# Patient Record
Sex: FEMALE | Race: WHITE | Employment: OTHER | URBAN - METROPOLITAN AREA
[De-identification: names, ages, dates, MRNs, and addresses within clinical notes are randomized per-mention and may not be internally consistent; named-entity substitution may affect disease eponyms.]

---

## 2021-04-30 LAB
ALBUMIN SERPL-MCNC: 1.4 G/DL
ALP BLD-CCNC: 63 U/L
ALT SERPL-CCNC: 0.87 U/L
ANION GAP SERPL CALCULATED.3IONS-SCNC: ABNORMAL MMOL/L
AST SERPL-CCNC: 17 U/L
BASOPHILS ABSOLUTE: 53 /ΜL
BASOPHILS RELATIVE PERCENT: 0.7 %
BILIRUB SERPL-MCNC: 0.4 MG/DL (ref 0.1–1.4)
BUN BLDV-MCNC: 27 MG/DL
CALCIUM SERPL-MCNC: 9 MG/DL
CHLORIDE BLD-SCNC: 105 MMOL/L
CHOLESTEROL, TOTAL: 193 MG/DL
CHOLESTEROL/HDL RATIO: 3.6
CO2: 28 MMOL/L
CREAT SERPL-MCNC: 0.74 MG/DL
EOSINOPHILS ABSOLUTE: 98 /ΜL
EOSINOPHILS RELATIVE PERCENT: 1.3 %
GFR CALCULATED: ABNORMAL
GLUCOSE BLD-MCNC: 88 MG/DL
HCT VFR BLD CALC: 45.4 % (ref 36–46)
HDLC SERPL-MCNC: 53 MG/DL (ref 35–70)
HEMOGLOBIN: 15.1 G/DL (ref 12–16)
LDL CHOLESTEROL CALCULATED: 113 MG/DL (ref 0–160)
LYMPHOCYTES ABSOLUTE: 2438 /ΜL
LYMPHOCYTES RELATIVE PERCENT: 32.5 %
MCH RBC QN AUTO: 30.5 PG
MCHC RBC AUTO-ENTMCNC: 33.3 G/DL
MCV RBC AUTO: 91.7 FL
MONOCYTES ABSOLUTE: 690 /ΜL
MONOCYTES RELATIVE PERCENT: 9.2 %
NEUTROPHILS ABSOLUTE: 4223 /ΜL
NEUTROPHILS RELATIVE PERCENT: 56.3 %
NONHDLC SERPL-MCNC: 140 MG/DL
PDW BLD-RTO: 13.1 %
PLATELET # BLD: 260 K/ΜL
PMV BLD AUTO: 11.4 FL
POTASSIUM SERPL-SCNC: 4.5 MMOL/L
RBC # BLD: 4.95 10^6/ΜL
SODIUM BLD-SCNC: 141 MMOL/L
TOTAL PROTEIN: 6.6
TRIGL SERPL-MCNC: 152 MG/DL
VLDLC SERPL CALC-MCNC: ABNORMAL MG/DL
WBC # BLD: 7.5 10^3/ML

## 2021-07-27 ENCOUNTER — OFFICE VISIT (OUTPATIENT)
Dept: FAMILY MEDICINE CLINIC | Age: 66
End: 2021-07-27
Payer: MEDICARE

## 2021-07-27 ENCOUNTER — HOSPITAL ENCOUNTER (OUTPATIENT)
Age: 66
Setting detail: SPECIMEN
Discharge: HOME | End: 2021-07-27
Payer: MEDICARE

## 2021-07-27 VITALS
DIASTOLIC BLOOD PRESSURE: 70 MMHG | HEIGHT: 66 IN | WEIGHT: 276.8 LBS | HEART RATE: 74 BPM | SYSTOLIC BLOOD PRESSURE: 120 MMHG | OXYGEN SATURATION: 98 % | TEMPERATURE: 97.6 F | BODY MASS INDEX: 44.48 KG/M2

## 2021-07-27 DIAGNOSIS — Z98.890 HISTORY OF ARTHROSCOPY OF RIGHT KNEE: ICD-10-CM

## 2021-07-27 DIAGNOSIS — M54.50 ACUTE LEFT-SIDED LOW BACK PAIN WITHOUT SCIATICA: ICD-10-CM

## 2021-07-27 DIAGNOSIS — M54.50 ACUTE LEFT-SIDED LOW BACK PAIN WITHOUT SCIATICA: Primary | ICD-10-CM

## 2021-07-27 PROCEDURE — 1123F ACP DISCUSS/DSCN MKR DOCD: CPT | Performed by: NURSE PRACTITIONER

## 2021-07-27 PROCEDURE — 99202 OFFICE O/P NEW SF 15 MIN: CPT | Performed by: NURSE PRACTITIONER

## 2021-07-27 PROCEDURE — G8417 CALC BMI ABV UP PARAM F/U: HCPCS | Performed by: NURSE PRACTITIONER

## 2021-07-27 PROCEDURE — G8427 DOCREV CUR MEDS BY ELIG CLIN: HCPCS | Performed by: NURSE PRACTITIONER

## 2021-07-27 PROCEDURE — 1036F TOBACCO NON-USER: CPT | Performed by: NURSE PRACTITIONER

## 2021-07-27 PROCEDURE — 3017F COLORECTAL CA SCREEN DOC REV: CPT | Performed by: NURSE PRACTITIONER

## 2021-07-27 PROCEDURE — 1090F PRES/ABSN URINE INCON ASSESS: CPT | Performed by: NURSE PRACTITIONER

## 2021-07-27 PROCEDURE — 4040F PNEUMOC VAC/ADMIN/RCVD: CPT | Performed by: NURSE PRACTITIONER

## 2021-07-27 PROCEDURE — 81001 URINALYSIS AUTO W/SCOPE: CPT

## 2021-07-27 PROCEDURE — G8400 PT W/DXA NO RESULTS DOC: HCPCS | Performed by: NURSE PRACTITIONER

## 2021-07-27 RX ORDER — IBUPROFEN 200 MG
200 TABLET ORAL EVERY 6 HOURS PRN
COMMUNITY
End: 2021-09-09

## 2021-07-27 RX ORDER — PREDNISONE 10 MG/1
TABLET ORAL
Qty: 30 TABLET | Refills: 0 | Status: SHIPPED | OUTPATIENT
Start: 2021-07-27 | End: 2021-08-09

## 2021-07-27 RX ORDER — ACETAMINOPHEN 500 MG
500 TABLET ORAL EVERY 6 HOURS PRN
COMMUNITY
End: 2021-09-09

## 2021-07-27 SDOH — ECONOMIC STABILITY: FOOD INSECURITY: WITHIN THE PAST 12 MONTHS, YOU WORRIED THAT YOUR FOOD WOULD RUN OUT BEFORE YOU GOT MONEY TO BUY MORE.: NEVER TRUE

## 2021-07-27 SDOH — ECONOMIC STABILITY: FOOD INSECURITY: WITHIN THE PAST 12 MONTHS, THE FOOD YOU BOUGHT JUST DIDN'T LAST AND YOU DIDN'T HAVE MONEY TO GET MORE.: NEVER TRUE

## 2021-07-27 ASSESSMENT — ENCOUNTER SYMPTOMS
BACK PAIN: 1
BOWEL INCONTINENCE: 0
ABDOMINAL PAIN: 0

## 2021-07-27 ASSESSMENT — PATIENT HEALTH QUESTIONNAIRE - PHQ9
2. FEELING DOWN, DEPRESSED OR HOPELESS: 0
SUM OF ALL RESPONSES TO PHQ9 QUESTIONS 1 & 2: 0
SUM OF ALL RESPONSES TO PHQ QUESTIONS 1-9: 0
1. LITTLE INTEREST OR PLEASURE IN DOING THINGS: 0
SUM OF ALL RESPONSES TO PHQ QUESTIONS 1-9: 0
SUM OF ALL RESPONSES TO PHQ QUESTIONS 1-9: 0

## 2021-07-27 ASSESSMENT — SOCIAL DETERMINANTS OF HEALTH (SDOH): HOW HARD IS IT FOR YOU TO PAY FOR THE VERY BASICS LIKE FOOD, HOUSING, MEDICAL CARE, AND HEATING?: NOT HARD AT ALL

## 2021-07-27 NOTE — PROGRESS NOTES
[address] Encounter  CHIEF COMPLAINT       Chief Complaint   Patient presents with    Back Pain     Pt states that back pain started 1 week ago. Pt states she was seen by a chiropractor for the pain, but it has not gotten better     HISTORY OF PRESENT ILLNESS   Krystina Tomas is a 72 y.o. female who presents with:    Back Pain  This is a new problem. The current episode started in the past 7 days. The problem occurs intermittently. The problem has been waxing and waning since onset. The pain is present in the lumbar spine. The quality of the pain is described as aching. The pain does not radiate. The pain is moderate. The symptoms are aggravated by bending, position and standing (pain after prolonged standing). Pertinent negatives include no abdominal pain, bladder incontinence, bowel incontinence, chest pain, dysuria, fever, numbness, paresis, paresthesias, pelvic pain, perianal numbness, tingling, weakness or weight loss. (Denies recent hx of trauma to the affected region, denies overlying redness. denies recent infection.) She has tried NSAIDs and chiropractic manipulation (+ Tylenol Arthritis) for the symptoms. The treatment provided no relief. Prior history of back problems: prior episode of L lower back pain that she was told was 2/2 a pulled muscle in her glute about 3 yrs ago. Previous work-up/ diagnostic testing for the current episode: none. Therapy to date includes: acetaminophen- Tylenol Arthritis, NSAID- OTC Aleve, oral steroid- dose-schuyler as above,chiropractic therapy/manipulation, home exercises. Patient moved to [place] from [place] one week ago. Has seen local chiropractor ROHIT Peres x 3 since symptoms began. Patient's medications, allergies, past medical, surgical, social and family histories were reviewed and updated as appropriate.      REVIEW OF SYSTEMS     Review of Systems   Constitutional: Negative for chills, fever, unexpected weight change and weight loss. Respiratory: Negative for cough, chest tightness and shortness of breath. Cardiovascular: Negative for chest pain, palpitations and leg swelling. Gastrointestinal: Negative for abdominal pain, blood in stool, bowel incontinence, diarrhea, nausea and vomiting. Genitourinary: Negative for bladder incontinence, difficulty urinating, dysuria, enuresis, flank pain, hematuria and pelvic pain. Musculoskeletal: Positive for back pain. Negative for joint swelling, myalgias and neck pain. Skin: Negative for color change and rash. Neurological: Negative for tingling, syncope, weakness, light-headedness, numbness and paresthesias. PAST MEDICAL HISTORY   No past medical history on file. SURGICAL HISTORY     Patient  has no past surgical history on file. CURRENT MEDICATIONS       Previous Medications    ACETAMINOPHEN (TYLENOL) 500 MG TABLET    Take 500 mg by mouth every 6 hours as needed for Pain    IBUPROFEN (ADVIL;MOTRIN) 200 MG TABLET    Take 200 mg by mouth every 6 hours as needed for Pain     ALLERGIES     Patient is is allergic to mushroom extract complex. FAMILY HISTORY     Patient'sfamily history is not on file. SOCIAL HISTORY     Patient  reports that she quit smoking about 6 months ago. She has a 25.00 pack-year smoking history. She has never used smokeless tobacco. She reports current alcohol use of about 3.0 standard drinks of alcohol per week. She reports that she does not use drugs. PHYSICAL EXAM       Vitals:    07/27/21 1106   BP: 120/70   Site: Right Upper Arm   Position: Sitting   Cuff Size: Medium Adult   Pulse: 74   Temp: 97.6 °F (36.4 °C)   TempSrc: Temporal   SpO2: 98%   Weight: 276 lb 12.8 oz (125.6 kg)   Height: 5' 6\" (1.676 m)     Physical Exam  Vitals reviewed. Constitutional:       General: She is not in acute distress. Appearance: She is well-groomed. She is not toxic-appearing. HENT:      Head: Normocephalic and atraumatic. Jaw:  There is normal jaw occlusion. Right Ear: External ear normal.      Left Ear: External ear normal.      Nose: Nose normal.      Mouth/Throat:      Lips: Pink. No lesions. Mouth: Mucous membranes are moist. No oral lesions. Pharynx: Oropharynx is clear. Uvula midline. Eyes:      General: Lids are normal.      Extraocular Movements: Extraocular movements intact. Conjunctiva/sclera: Conjunctivae normal.      Pupils: Pupils are equal, round, and reactive to light. Neck:      Trachea: Trachea and phonation normal.   Cardiovascular:      Rate and Rhythm: Normal rate and regular rhythm. Heart sounds: S1 normal and S2 normal. No murmur heard. No friction rub. No gallop. Pulmonary:      Effort: Pulmonary effort is normal. No tachypnea. Breath sounds: Normal breath sounds and air entry. Abdominal:      General: Bowel sounds are normal.      Palpations: Abdomen is soft. There is no hepatomegaly, splenomegaly or mass. Tenderness: There is no abdominal tenderness. There is no right CVA tenderness or left CVA tenderness. Musculoskeletal:      Cervical back: Normal, normal range of motion and neck supple. No spinous process tenderness or muscular tenderness. Thoracic back: Normal.      Lumbar back: Tenderness (paraspinal- no local tenderness or mass) present. No swelling, edema, deformity, signs of trauma, spasms or bony tenderness. Decreased range of motion (sl. decreased forward flexion 2/2 pain). Negative right straight leg raise test and negative left straight leg raise test.      Right lower leg: No edema. Left lower leg: No edema. Skin:     General: Skin is warm and dry. Neurological:      General: No focal deficit present. Mental Status: She is alert. Mental status is at baseline. Sensory: Sensation is intact. Motor: Motor function is intact. Gait: Gait is intact.    Psychiatric:         Mood and Affect: Mood and affect normal.         Speech: Speech normal.         Behavior: Behavior is cooperative. READY CARE COURSE   Labs:  No results found for this visit on 07/27/21. IMAGING:  No orders to display     Scheduled Meds:  Continuous Infusions:  PRN Meds:. PROCEDURES:  FINAL IMPRESSION    72 y.o. female with left lower back pain for one week. Non-traumatic, afebrile. No back pain red flags on exam. Likely musculoskeletal etiology given hx and exam. Patient is non-toxic appearing, NVI distally. Diagnosis Orders   1. Acute left-sided low back pain without sciatica  predniSONE (DELTASONE) 10 MG tablet    [facility] - [place]    Urinalysis   2. History of arthroscopy of right knee  [facility] - [facility]     DISPOSITION/PLAN   - Recommended she follow-up to establish care with a [facility]) PCP for continuity/ coordination of care  - For acute pain, relative rest, apply cold packs intermittently  - May switch to heat/ moist heat later- do not sleep on heating pad  - Advised to take OTC Tylenol Arthritis regularly vs prn for next 3-5d  - Prescription for short steroid course given  - Referral to Physical Therapy  - Educational material distributed  - Gradually reintroduce usual activities, gentle ROM, avoid heavy lifting until better  - Discussed warning symptoms which would prompt re-evaluation    PATIENT REFERRED TO:  Return for follow-up as needed if symptoms do not begin to improve in the next 1 week + to establish with a PCP. DISCHARGE MEDICATIONS:  New Prescriptions    PREDNISONE (DELTASONE) 10 MG TABLET    Take 4 tabs daily for 3 days, then 3 tabs for 3 days, then 2 tabs for 3 days, then 1 tab daily until finished. Oral Steroid Instructions: Take each dose with a small snack or meal to lessen potential GI upset. Follow dosing instructions provided with prescription. Common side effects include difficulty sleeping and irritability. Take full course as ordered.      Cannot display discharge medications since this is not an

## 2021-07-28 LAB
BACTERIA: ABNORMAL /HPF
BILIRUBIN URINE: NEGATIVE
BLOOD, URINE: NEGATIVE
CLARITY: ABNORMAL
COLOR: ABNORMAL
GLUCOSE URINE: NEGATIVE MG/DL
KETONES, URINE: ABNORMAL MG/DL
LEUKOCYTE ESTERASE, URINE: ABNORMAL
NITRITE, URINE: NEGATIVE
PH UA: 7 (ref 5–9)
PROTEIN UA: 100 MG/DL
SPECIFIC GRAVITY UA: 1.03 (ref 1–1.03)
UROBILINOGEN, URINE: 1 E.U./DL
WBC UA: ABNORMAL /HPF (ref 0–5)

## 2021-08-02 ENCOUNTER — HOSPITAL ENCOUNTER (OUTPATIENT)
Dept: PHYSICAL THERAPY | Age: 66
Setting detail: THERAPIES SERIES
Discharge: HOME | End: 2021-08-02
Payer: MEDICARE

## 2021-08-02 PROCEDURE — 97110 THERAPEUTIC EXERCISES: CPT

## 2021-08-02 PROCEDURE — 97163 PT EVAL HIGH COMPLEX 45 MIN: CPT

## 2021-08-02 ASSESSMENT — PAIN DESCRIPTION - FREQUENCY: FREQUENCY: INTERMITTENT

## 2021-08-02 ASSESSMENT — PAIN DESCRIPTION - DESCRIPTORS: DESCRIPTORS: SHARP;SORE

## 2021-08-02 ASSESSMENT — PAIN DESCRIPTION - ORIENTATION: ORIENTATION: LEFT

## 2021-08-02 ASSESSMENT — PAIN DESCRIPTION - PAIN TYPE: TYPE: ACUTE PAIN

## 2021-08-02 ASSESSMENT — PAIN DESCRIPTION - LOCATION: LOCATION: BACK;KNEE

## 2021-08-02 NOTE — PROGRESS NOTES
Screening  Patient Currently in Pain: Yes  Pain Assessment  Pain Assessment: 0-10  Pain Level:  (0 at rest, pain up to near 10/10 after standing or walking 20 min or longer)  Pain Type: Acute pain  Pain Location: Back;Knee  Pain Orientation: Left  Pain Radiating Towards: L low back, occasionally radiates to L neck; R knee pain intermittently 2-3/10 on ave  Pain Descriptors: Sarath Curia; Sore  Pain Frequency: Intermittent  Vital Signs  Patient Currently in Pain: Yes    Social/Functional History  Social/Functional History  Occupation: Retired  Leisure & Hobbies: swimming,    Objective   Observation/Palpation  Palpation:  Mod tenderness and increased tone with palpation of L lumbar paraspinals  Observation: R knee edema  Edema: R knee    AROM RLE (degrees)  RLE General AROM: 0-120 deg in supine (AAROM 125 deg in supine without pain); seated LAQ and return from LAQ to full flex in sitting painful  AROM LLE (degrees)  LLE AROM : WFL  LLE General AROM: L knee AROM 0-125 deg  without pain  Spine  Lumbar: flex WFL (currently no pain however per pt prior to oral steroid medication flex very painful and limited), ext 75% (\"feels good\"), R SB WFL, L SB 75% ERP    Strength RLE  Strength RLE: Exception  R Hip Flexion: 4/5  R Hip Extension: 4-/5  R Hip ABduction: 4-/5;4/5  R Knee Flexion: 4/5  R Knee Extension: 4-/5 (painful MMT knee ext)  R Ankle Dorsiflexion: 4+/5  Strength LLE  Strength LLE: WFL  Comment: hip strength grossly 4 to 4+/5, knee strength grossly 4 to 4 +/5, ankle df 4+/5     Additional Measures  Special Tests: Neg SLR, Neg scour and neg FABERs L LE, positive PA testing central and unilat L L4-5   Oswestry=31/50=62%     Ambulation  Ambulation?: Yes  Ambulation 1  Surface: carpet;level tile  Device: No Device  Assistance: Independent  Quality of Gait: slow pace, slight decreased stance time R Le compared to L LE, no complaints of back pain however complaints of 1/10 R knee pain and audible \"popping\" R knee  Gait Deviations: Slow Yolanda;Decreased step length;Decreased step height  Distance: 120 feet  Stairs/Curb  Stairs?: No     Exercises  Exercise 1: Reviewed exs issued by prior PT in [place] and by 's office for low back and starred exs to cont with-see below  Exercise 2: SLR x 10 with QS H3  Exercise 3: heel slide R knee x 10 H 3  Exercise 4: L sidelying R hip abd x 10 H3-vcs and tactile cues for form  Exercise 5: prone prop H 20 sec, also prone full ext x 5- no pain, however not issed as part of HEP yet  Exercise 6: pelvic tilt x 10 H3     Assessment   Conditions Requiring Skilled Therapeutic Intervention  Body structures, Functions, Activity limitations: Decreased functional mobility ; Decreased ROM; Decreased strength  Assessment: 72year old female presents to OP PT with diagnosis of LBP and recent history of R knee arthroscopy (May 2021) currently demonstrates slow guarded AROM lumbar spine with some pain, demonstrating weakness R LE and limited standing /walking tolerance. Pt would benefit from PT to address impairments and improve ROM, strength and overall functional mobilty. Treatment Diagnosis: LBP, weakness R LE S/P R knee arthroscopy  Prognosis: Good  REQUIRES PT FOLLOW UP: Yes  Treatment Initiated : IE completed assessing low back and R LE. Pt educated on therapy POC, reviewed and modified HEP. Pt educted on joint protection, instructed in safe correct technique for supine to sit transitions, encouraged frequent changes in positions and short walks as toleated.        Plan   Plan  Times per week: 2  Plan weeks: 4-5  Current Treatment Recommendations: Strengthening, ROM, Functional Mobility Training, Manual Therapy - Soft Tissue Mobilization, Home Exercise Program, Modalities (Manual therpy to lumbar spine/ R knee PRN, modalities PRN for pain reduction; KT/ taping to R knee PRN as pt had good response however monitor skin closely as pt reports rash following application of tape pt had purchased on her own) Goals  Short term goals  Time Frame for Short term goals: 2 wks  Short term goal 1: I with HEP and report compliance with HEP 5/7 days or greater at least 1x per day  Short term goal 2: Pt report continued decrease in low back pain (4/10 or less) during ADLs once done with oral steroid medication  Long term goals  Time Frame for Long term goals : 4-5 weeks  Long term goal 1: Improve AROM lumbar spine all planes WFL without pain and R knee AROM comparable to L knee without pain  Long term goal 2: Improve R LE strength 4+/5 or greater to allow Pt to perform ADLs with increased ease including reciprocal stairclimbing  Long term goal 3: Improve Oswestry less than 20%  Long term goal 4: Pt perform ADLs including community ambulation, housework, return to rec activities (swimming) and tolerating standing and walking 20-30 min or greater with 2/10 or less low back and R knee pain 75% of the day or greater  Long term goal 5: I with advanced HEP to further improve ROM and strength  Patient Goals   Patient goals : Get back to walking normally and doing every day activities wthout pain       Therapy Time   Individual Concurrent Group Co-treatment   Time In  1000         Time Out  1105         Minutes  [address], [place], [place]

## 2021-08-04 ENCOUNTER — HOSPITAL ENCOUNTER (OUTPATIENT)
Dept: PHYSICAL THERAPY | Age: 66
Setting detail: THERAPIES SERIES
Discharge: HOME | End: 2021-08-04
Payer: MEDICARE

## 2021-08-04 PROCEDURE — 97140 MANUAL THERAPY 1/> REGIONS: CPT

## 2021-08-04 PROCEDURE — 97116 GAIT TRAINING THERAPY: CPT

## 2021-08-04 PROCEDURE — 97110 THERAPEUTIC EXERCISES: CPT

## 2021-08-04 ASSESSMENT — PAIN DESCRIPTION - FREQUENCY: FREQUENCY: INTERMITTENT

## 2021-08-04 ASSESSMENT — PAIN DESCRIPTION - PAIN TYPE: TYPE: ACUTE PAIN;SURGICAL PAIN

## 2021-08-04 ASSESSMENT — PAIN DESCRIPTION - LOCATION: LOCATION: BACK;KNEE

## 2021-08-04 ASSESSMENT — PAIN SCALES - GENERAL: PAINLEVEL_OUTOF10: 0

## 2021-08-04 NOTE — PROGRESS NOTES
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 Arrives with 0/10 pain in back and knee  General Comment  Comments: MRI R knee-torn meniscus, bone spurs XRay R knee- severe OA  Pain Screening  Patient Currently in Pain: No  Pain Assessment  Pain Assessment: 0-10  Pain Level: 0  Pain Type: Acute pain;Surgical pain  Pain Location: Back;Knee  Pain Radiating Towards: L low back and R knee  Pain Frequency: Intermittent  Vital Signs  Patient Currently in Pain: No       Treatment Activities:      Ambulation  Ambulation?: Yes  Ambulation 1  Surface: carpet;level tile  Device: No Device  Assistance: Independent  Quality of Gait: imporved pace, good heels trkie, slightly longer step RLE than LLE, mild c/o knee apin ant lat approx 2 min into walk.    Distance: 440ft in 2 min and 16 sec  Stairs/Curb  Stairs?: No        PROM RLE (degrees)  RLE General PROM: knee 0-126  AROM RLE (degrees)  RLE General AROM: knee 0-123  AROM LLE (degrees)  LLE General AROM: knee 0-135       Exercises  Exercise 2: R SLR x 10 with QS H3  Exercise 3: heel slide R knee x 10 H 3  Exercise 4: L sidelying R hip abd x 10 H3-vcs and tactile cues for form  Exercise 5: prone prop to full arm 10 hold x 3 reps  Exercise 7: R SLR VMO 10 reps x 3 hold   Exercise 8: bridge with ball for adduction and abdominal bracing to start motion 10 reps x 3 hold   Exercise 9: prone hip ext with partiallly bent knee 310 reps 2 hold left and right knee  Exercise 10: attempt sit to stand to sit with R knee in slight more flexion as keeps RLE approx 7-8 inches in front of L foot for sit to stands, able to complete 5 reps, but inc paininR ant- lat knee     Modalities  Cryotherapy (Minutes\Location): cold pack x 10 minutes post to decrease R knee an-lat pain from2 to 0 at end of ice  Manual therapy  Other: KT tape to R knee to decrease pain and edema. 25-50% tension acorss Baker's cyst post R knee. @ \"I strips at 25-50% tension one lat to medial across patella, and inf lat to sup medial across patells to imporve patellar alignment      Assessment:   Conditions Requiring Skilled Therapeutic Intervention  Body structures, Functions, Activity limitations: Decreased functional mobility ; Decreased ROM; Decreased strength  Assessment: Pt with 0/10 pain in R knee and left low back , Added additional knee MARIANN to gain pbetter patella position. KT tape used  2 I strip for R knee patella tracking, and also for small post Bakers cyst.. Issued VMO and bridge with ball to HEP. Pain up to 2/10 with walk and sit to stands. cold pack x 10 minutes post to dec pain and edema, pain R knee post 0/10, Back remains at 0/10.      Pt relates 4 different RLE infections over lifetime and RLE always more swollen or bigger than LLE  Treatment Diagnosis: LBP, weakness R LE S/P R knee arthroscopy  REQUIRES PT FOLLOW UP: Yes  Activity Tolerance  Activity Tolerance: Patient limited by pain      Goals:  Short term goals  Time Frame for Short term goals: 2 wks  Short term goal 1: I with HEP and report compliance with HEP 5/7 days or greater at least 1x per day  Short term goal 2: Pt report continued decrease in low back pain (4/10 or less) during ADLs once done with oral steroid medication  Long term goals  Time Frame for Long term goals : 4-5 weeks  Long term goal 1: Improve AROM lumbar spine all planes WFL without pain and R knee AROM comparable to L knee without pain  Long term goal 2: Improve R LE strength 4+/5 or greater to allow Pt to perform ADLs with increased ease including reciprocal stairclimbing  Long term goal 3: Improve Oswestry less than 20%  Long term goal 4: Pt perform ADLs including community ambulation, housework, return to rec activities (swimming) and tolerating standing and walking 20-30 min or greater with 2/10 or less low back and R knee pain 75% of the day or greater  Long term goal 5: I with advanced HEP to further improve ROM and strength  Patient Goals   Patient goals : Get back to walking normally and doing every day activities wthout pain    Plan:  continue 2x week and advance as able per plan.             Therapy Time   Individual Concurrent Group Co-treatment   Time In  1200         Time Out  110         Minutes  60tx, 10 cold pack                 Rosita Thorpe, [id number]

## 2021-08-05 DIAGNOSIS — Z00.00 ROUTINE CHECK-UP: Primary | ICD-10-CM

## 2021-08-09 ENCOUNTER — HOSPITAL ENCOUNTER (OUTPATIENT)
Dept: PHYSICAL THERAPY | Age: 66
Setting detail: THERAPIES SERIES
Discharge: HOME | End: 2021-08-09
Payer: MEDICARE

## 2021-08-09 PROCEDURE — 97110 THERAPEUTIC EXERCISES: CPT

## 2021-08-09 ASSESSMENT — PAIN DESCRIPTION - FREQUENCY: FREQUENCY: INTERMITTENT

## 2021-08-09 ASSESSMENT — PAIN DESCRIPTION - PAIN TYPE: TYPE: ACUTE PAIN

## 2021-08-09 ASSESSMENT — PAIN DESCRIPTION - ORIENTATION: ORIENTATION: RIGHT

## 2021-08-09 ASSESSMENT — PAIN SCALES - GENERAL: PAINLEVEL_OUTOF10: 3

## 2021-08-09 ASSESSMENT — PAIN DESCRIPTION - DESCRIPTORS: DESCRIPTORS: SHARP

## 2021-08-09 ASSESSMENT — PAIN DESCRIPTION - LOCATION: LOCATION: KNEE

## 2021-08-09 NOTE — PROGRESS NOTES
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); R knee arthroscopy May 2021,  Family / Caregiver Present: No  Referring Practitioner: Amber Hernandez  PT Visit Information  Total # of Visits Approved: 10  Total # of Visits to Date: 3  Plan of Care/Certification Expiration Date: 21  No Show: 0  Canceled Appointment: 0  Subjective  Subjective: Pt reports no back pain currently but having some sharp pain of her right pain \"3/10\"   General Comment  Comments: MRI R knee-torn meniscus, bone spurs XRay R knee- severe OA  Pain Assessment  Pain Assessment: 0-10  Pain Level: 3  Pain Type: Acute pain  Pain Location: Knee  Pain Orientation: Right  Pain Descriptors: Sharp  Pain Frequency: Intermittent       Treatment Activities:         Exercises  Exercise 2: L LE SLR x 10 with QS H3; R LE x 7 reps   Exercise 3: heel slide R knee x 10 H 3  Exercise 4: L sidelying R hip abd x 10 H3-vcs and tactile cues for form  Exercise 6: pelvic tilt x 10 H3  Exercise 8: bridge with ball for adduction and abdominal bracing to start motion 10 reps x 3 hold   Exercise 9: prone hip ext with partiallly bent knee x10 reps 2 hold left and right knee  Exercise 11: *Seated HS stretch x 3 H30  Exercise 12: Hooklying abd with RTB x 15 reps   Exercise 13: Supine anterior hip stretch x 3 H30   Exercise 14: Supine lateral hip stretch x 3 H30      Modalities  Cryotherapy (Minutes\Location): To ice at home                             Assessment:   Conditions Requiring Skilled Therapeutic Intervention  Body structures, Functions, Activity limitations: Decreased functional mobility ; Decreased ROM; Decreased strength  Assessment: Pt comes to therpay with 0/10 back pain but 3/10 right knee pain.  Pt started PT with heel slides and PT added seated HS stretch to warm up pts muscles and decrease knee pain. Pt then performed supine ther ex needing cues for form and then added right hip stretches due to muscle soreness. Pt reports that the stretches 'felt good'. Pt then progressed with strengthen ther ex cues cues for proper technique. Pt then given a copy of seated ther ex and pt reports 0/10 back and only 2-3/10 right sharp knee pain with movement. Pt plans to ice at home. Continue to progress to pts tolerance. KT tape was intact. Pt to remove tape before next session.    Treatment Diagnosis: LBP, weakness R LE S/P R knee arthroscopy  REQUIRES PT FOLLOW UP: Yes  Activity Tolerance  Activity Tolerance: Patient limited by pain      G-Code:     OutComes Score                                                     Goals:  Short term goals  Time Frame for Short term goals: 2 wks  Short term goal 1: I with HEP and report compliance with HEP 5/7 days or greater at least 1x per day  Short term goal 2: Pt report continued decrease in low back pain (4/10 or less) during ADLs once done with oral steroid medication  Long term goals  Time Frame for Long term goals : 4-5 weeks  Long term goal 1: Improve AROM lumbar spine all planes WFL without pain and R knee AROM comparable to L knee without pain  Long term goal 2: Improve R LE strength 4+/5 or greater to allow Pt to perform ADLs with increased ease including reciprocal stairclimbing  Long term goal 3: Improve Oswestry less than 20%  Long term goal 4: Pt perform ADLs including community ambulation, housework, return to rec activities (swimming) and tolerating standing and walking 20-30 min or greater with 2/10 or less low back and R knee pain 75% of the day or greater  Long term goal 5: I with advanced HEP to further improve ROM and strength  Patient Goals   Patient goals : Get back to walking normally and doing every day activities wthout pain    Plan:       Frequency and duration of tx  Days: 2  Weeks: 4 (4-6) Therapy Time   Individual Concurrent Group Co-treatment   Time In  10;00am          Time Out  11:00am         Minutes  60 min                  Omar Martell PT  License and Documentation Cosign  Therapy License Number: [id number]

## 2021-08-11 ENCOUNTER — HOSPITAL ENCOUNTER (OUTPATIENT)
Dept: PHYSICAL THERAPY | Age: 66
Setting detail: THERAPIES SERIES
Discharge: HOME | End: 2021-08-11
Payer: MEDICARE

## 2021-08-11 PROCEDURE — 97116 GAIT TRAINING THERAPY: CPT

## 2021-08-11 PROCEDURE — 97110 THERAPEUTIC EXERCISES: CPT

## 2021-08-11 PROCEDURE — 97140 MANUAL THERAPY 1/> REGIONS: CPT

## 2021-08-11 NOTE — PROGRESS NOTES
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 states she removed her KT tape yesterday now her knee is alittle more sore only when she bends it. Pt. states KT helps. Pt. broguht in [facility] to go over as was having previous therapy in the past in [place]. Pt. denies pain currently in her back or knee. Pain Screening  Patient Currently in Pain: No  Vital Signs  Patient Currently in Pain: No       Treatment Activities:   Manual therapy  Other: KT tape to R knee to decrease pain and edema. 25-50% tension acorss Baker's cyst post R knee. @ \"I strips at 25-50% tension one lat to medial across patella, and inf lat to sup medial across patells to imporve patellar alignment              Ambulation  Ambulation?: Yes  Ambulation 1  Surface: carpet;level tile  Device: No Device  Assistance: Independent  Quality of Gait: imporved pace, good heels trkie, slightly longer step RLE than LLE no c/o knee pain with KTdonned and less clicking per pt.    Distance: 440'   Stairs/Curb  Stairs?: No                Exercises  Exercise 2: L LE SLR x 10 with QS H3; R LE x 10 reps VC for pelvic tilt   Exercise 4: L sidelying R hip abd x 10 H3-vcs and tactile cues for form  Exercise 5: prone prop to full arm 10 hold x 3 reps  Exercise 6: pelvic tilt x 10 H3  Exercise 7: R SLR VMO 10 reps x 3 hold   Exercise 8: bridge with ball for adduction and abdominal bracing to start motion 10 reps x 3 hold   Exercise 13: Supine anterior hip stretch x 3 H30   Exercise 14: Supine lateral hip stretch x 3 H30   Exercise 15: step up forward lead R x 10 4\"   Exercise 16: step up and over lead R 2\" x 10 inc 4 in inc pain and difficulty 1-2 reps with HR and cane for imporved technique. Modalities  Cryotherapy (Minutes\Location): To ice at home  Manual therapy  Other: KT tape to R knee to decrease pain and edema. 25-50% tension acorss Baker's cyst post R knee. @ \"I strips at 25-50% tension one lat to medial across patella, and inf lat to sup medial across patells to imporve patellar alignment                          Assessment:   Conditions Requiring Skilled Therapeutic Intervention  Body structures, Functions, Activity limitations: Decreased functional mobility ; Decreased ROM; Decreased strength  Assessment: Pt. arrives to therapy with no pain but pain can be up to 8/10 with returning to bending position after being straight. Reapplied KT tape as removed yesterday reports it helps. Reviewed exercises as pt. brought in HEP. VC needed for form with some exercizes. Attempted step ups able but stepping down with inc pain and poor technique thus deferred on 4\" box. Pt. denies pain post session. Pt. to ice at home. Treatment Diagnosis: LBP, weakness R LE S/P R knee arthroscopy  REQUIRES PT FOLLOW UP: Yes  Activity Tolerance  Activity Tolerance: Patient limited by pain; Patient Tolerated treatment well        Goals:  Short term goals  Time Frame for Short term goals: 2 wks  Short term goal 1: I with HEP and report compliance with HEP 5/7 days or greater at least 1x per day  Short term goal 2: Pt report continued decrease in low back pain (4/10 or less) during ADLs once done with oral steroid medication  Long term goals  Time Frame for Long term goals : 4-5 weeks  Long term goal 1: Improve AROM lumbar spine all planes WFL without pain and R knee AROM comparable to L knee without pain  Long term goal 2: Improve R LE strength 4+/5 or greater to allow Pt to perform ADLs with increased ease including reciprocal stairclimbing  Long term goal 3: Improve Oswestry less than 20%  Long term goal 4: Pt perform ADLs including community ambulation, housework, return to rec activities (swimming) and tolerating standing and walking 20-30 min or greater with 2/10 or less low back and R knee pain 75% of the day or greater  Long term goal 5: I with advanced HEP to further improve ROM and strength  Patient Goals   Patient goals : Get back to walking normally and doing every day activities wthout pain    Plan:       Frequency and duration of tx  Days: 2  Weeks: 4 (4-6)     Therapy Time   Individual Concurrent Group Co-treatment   Time In  100         Time Out  200         Minutes  [address] [place] [address]  License and [address] Number: [id number]

## 2021-08-16 ENCOUNTER — HOSPITAL ENCOUNTER (OUTPATIENT)
Dept: PHYSICAL THERAPY | Age: 66
Setting detail: THERAPIES SERIES
Discharge: HOME | End: 2021-08-16
Payer: MEDICARE

## 2021-08-16 PROCEDURE — 97110 THERAPEUTIC EXERCISES: CPT

## 2021-08-16 PROCEDURE — 97140 MANUAL THERAPY 1/> REGIONS: CPT

## 2021-08-16 ASSESSMENT — PAIN DESCRIPTION - LOCATION: LOCATION: KNEE

## 2021-08-16 ASSESSMENT — PAIN DESCRIPTION - ORIENTATION: ORIENTATION: RIGHT

## 2021-08-16 ASSESSMENT — PAIN SCALES - GENERAL: PAINLEVEL_OUTOF10: 1

## 2021-08-16 NOTE — PROGRESS NOTES
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 Pt reports less pain when wearing KT tape. Pt states her back feels great! \"I can even put my sock on my right foot now\". Pain Screening  Patient Currently in Pain: Yes  Pain Assessment  Pain Assessment: 0-10  Pain Level: 1  Pain Location: Knee  Pain Orientation: Right  Vital Signs  Patient Currently in Pain: Yes       Treatment Activities:   Manual therapy  Other: KT tape to R knee I strip to R knee at patella with medial pull 50% tension. I strip at patella with medial superior pull 50% tension for patellar tracking to dec pain. Exercises  Exercise 2: BLE SLR; H3'' x 10 (VC's for form)   Exercise 3: BLE VMO SLR; H3''   Exercise 4: L sidelying R hip abd x 10 H3-5 vcs and tactile cues for form  Exercise 8: bridge with ball for adduction and abdominal bracing to start motion 15 reps x 5 hold   Exercise 9: prone hip ext; H3'' x 10 (VC's and tactile cues for form)   Exercise 10: prone HS curls; H3'' x 10 (  Exercise 11: sidelying R hip ADDuction; H3'' x 10   Exercise 14: sidelying R IT band stretch; H30'' x 3   Exercise 20: recumbent bike; x 5 min      Modalities  Cryotherapy (Minutes\Location): pt issued ice to go. Manual therapy  Other: KT tape to R knee I strip to R knee at patella with medial pull 50% tension.  I strip at patella with medial superior pull 50% tension for patellar tracking to dec pain. Assessment:   Conditions Requiring Skilled Therapeutic Intervention  Body structures, Functions, Activity limitations: Decreased functional mobility ; Decreased ROM; Decreased strength  Assessment: Pt progressing with strengthening tolerating inc hold times but continues to require VC's for inc form. Pt reports no pain in back and less pain in knee with KT tape applied. Continue with POC. Treatment Diagnosis: LBP, weakness R LE S/P R knee arthroscopy  REQUIRES PT FOLLOW UP: Yes  Activity Tolerance  Activity Tolerance: Patient limited by pain; Patient Tolerated treatment well      G-Code:     OutComes Score                                                     Goals:  Short term goals  Time Frame for Short term goals: 2 wks  Short term goal 1: I with HEP and report compliance with HEP 5/7 days or greater at least 1x per day  Short term goal 2: Pt report continued decrease in low back pain (4/10 or less) during ADLs once done with oral steroid medication  Long term goals  Time Frame for Long term goals : 4-5 weeks  Long term goal 1: Improve AROM lumbar spine all planes WFL without pain and R knee AROM comparable to L knee without pain  Long term goal 2: Improve R LE strength 4+/5 or greater to allow Pt to perform ADLs with increased ease including reciprocal stairclimbing  Long term goal 3: Improve Oswestry less than 20%  Long term goal 4: Pt perform ADLs including community ambulation, housework, return to rec activities (swimming) and tolerating standing and walking 20-30 min or greater with 2/10 or less low back and R knee pain 75% of the day or greater  Long term goal 5: I with advanced HEP to further improve ROM and strength  Patient Goals   Patient goals : Get back to walking normally and doing every day activities wthout pain    Plan:       Frequency and duration of tx  Days: 2  Weeks:  (4-6)     Therapy Time   Individual Concurrent Group Co-treatment   Time In  1100         Time Out  1200         Minutes  Katherine [address] [place] [address]  License and [address] Number: [id number]

## 2021-08-18 ENCOUNTER — HOSPITAL ENCOUNTER (OUTPATIENT)
Dept: PHYSICAL THERAPY | Age: 66
Setting detail: THERAPIES SERIES
Discharge: HOME | End: 2021-08-18
Payer: MEDICARE

## 2021-08-18 PROCEDURE — 97116 GAIT TRAINING THERAPY: CPT

## 2021-08-18 PROCEDURE — 97140 MANUAL THERAPY 1/> REGIONS: CPT

## 2021-08-18 PROCEDURE — 97110 THERAPEUTIC EXERCISES: CPT

## 2021-08-18 ASSESSMENT — PAIN DESCRIPTION - LOCATION: LOCATION: KNEE

## 2021-08-18 ASSESSMENT — PAIN SCALES - GENERAL: PAINLEVEL_OUTOF10: 1

## 2021-08-18 ASSESSMENT — PAIN DESCRIPTION - ORIENTATION: ORIENTATION: RIGHT

## 2021-08-18 NOTE — PROGRESS NOTES
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 states her tape came off after wearing 1 day. Longer pants. Pain /10 today. Better with KT donned. Pain Screening  Patient Currently in Pain: Yes  Pain Assessment  Pain Assessment: 0-10  Pain Level: 1  Pain Location: Knee  Pain Orientation: Right  Vital Signs  Patient Currently in Pain: Yes       Treatment Activities:   Manual therapy  Other: KT tape to R knee I strip to R knee at patella with medial pull 50% tension. I strip at patella with medial superior pull 50% tension for patellar tracking to dec pain. Ambulation  Ambulation?: Yes  Ambulation 1  Surface: carpet;level tile  Device: No Device  Assistance: Independent  Quality of Gait: occ clicking R knee non painful. Pt. occ circumducting RLE and VC needed heel toe gait pattern   Distance: 440'  Stairs/Curb  Stairs?: Yes  Stairs  # Steps : 10  Stairs Height:  (7\" )  Rails: Left ascending  Device: No Device  Assistance: Stand by assistance  Comment: Up reciprical with inc effort ascending RLE, down nonreciprical laterally approx 1/4 turn VC for descending forward able to mild pain one step at a time.                   Exercises  Exercise 2: BLE SLR; H3'' 2x 10   Exercise 3: BLE VMO SLR; H3'' 2 x10 VC for form   Exercise 4: L sidelying R hip abd 2x 10 H3-5 vcs  for form  Exercise 8: bridge with ball for adduction and abdominal bracing to start motion 15 reps x 5 hold Exercise 9: prone hip ext; H3'' x 10 (VC's and tactile cues for form) BLE   Exercise 10: prone BLE HS curl; 2x 10 hold 5 sec slow release   Exercise 11: sidelying R hip ADDuction; H3'' x 10   Exercise 15: step up forward lead R x 10 6\"    Exercise 16: step up and over lead R 4\" x 10 VC for form and smoother descent   Exercise 17: step up and over lateral x 10 6\"   Exercise 20: recumbent bike; x 5 min         Manual therapy  Other: KT tape to R knee I strip to R knee at patella with medial pull 50% tension. I strip at patella with medial superior pull 50% tension for patellar tracking to dec pain. Assessment:   Conditions Requiring Skilled Therapeutic Intervention  Body structures, Functions, Activity limitations: Decreased functional mobility ; Decreased ROM; Decreased strength  Assessment: Pt. continues to progress with strengthening tolerating inc reps therex this date. Added step ups to advance function as still descending stairs one at a time with some discomfort and extra effort ascending R. Cont with KT tape less clicking post application. Although gait trial this date no KT some clicking R knee but reports non painful. No pain post session.     Treatment Diagnosis: LBP, weakness R LE S/P R knee arthroscopy  Prognosis: Good  REQUIRES PT FOLLOW UP: Yes  Activity Tolerance  Activity Tolerance: Patient Tolerated treatment well        Goals:  Short term goals  Time Frame for Short term goals: 2 wks  Short term goal 1: I with HEP and report compliance with HEP 5/7 days or greater at least 1x per day  Short term goal 2: Pt report continued decrease in low back pain (4/10 or less) during ADLs once done with oral steroid medication  Long term goals  Time Frame for Long term goals : 4-5 weeks  Long term goal 1: Improve AROM lumbar spine all planes WFL without pain and R knee AROM comparable to L knee without pain  Long term goal 2: Improve R LE strength 4+/5 or greater to allow Pt to perform ADLs with increased ease including reciprocal stairclimbing  Long term goal 3: Improve Oswestry less than 20%  Long term goal 4: Pt perform ADLs including community ambulation, housework, return to rec activities (swimming) and tolerating standing and walking 20-30 min or greater with 2/10 or less low back and R knee pain 75% of the day or greater  Long term goal 5: I with advanced HEP to further improve ROM and strength  Patient Goals   Patient goals : Get back to walking normally and doing every day activities wthout pain    Plan:       Frequency and duration of tx  Days: 2  Weeks:  (4-6)     Therapy Time   Individual Concurrent Group Co-treatment   Time In  1100         Time Out  1200         Minutes  60                 She Moss PTA  License and [address] Number: [id number]

## 2021-08-19 ASSESSMENT — ENCOUNTER SYMPTOMS
COLOR CHANGE: 0
COUGH: 0
NAUSEA: 0
DIARRHEA: 0
VOMITING: 0
CHEST TIGHTNESS: 0
BLOOD IN STOOL: 0
SHORTNESS OF BREATH: 0

## 2021-08-23 ENCOUNTER — HOSPITAL ENCOUNTER (OUTPATIENT)
Dept: PHYSICAL THERAPY | Age: 66
Setting detail: THERAPIES SERIES
Discharge: HOME | End: 2021-08-23
Payer: MEDICARE

## 2021-08-23 PROCEDURE — 97110 THERAPEUTIC EXERCISES: CPT

## 2021-08-23 PROCEDURE — 97116 GAIT TRAINING THERAPY: CPT

## 2021-08-25 ENCOUNTER — HOSPITAL ENCOUNTER (OUTPATIENT)
Dept: PHYSICAL THERAPY | Age: 66
Setting detail: THERAPIES SERIES
Discharge: HOME | End: 2021-08-25
Payer: MEDICARE

## 2021-08-25 PROCEDURE — 97116 GAIT TRAINING THERAPY: CPT

## 2021-08-25 PROCEDURE — 97110 THERAPEUTIC EXERCISES: CPT

## 2021-08-25 PROCEDURE — 97140 MANUAL THERAPY 1/> REGIONS: CPT

## 2021-08-25 ASSESSMENT — PAIN DESCRIPTION - LOCATION: LOCATION: KNEE

## 2021-08-25 ASSESSMENT — PAIN DESCRIPTION - PAIN TYPE: TYPE: ACUTE PAIN

## 2021-08-25 ASSESSMENT — PAIN DESCRIPTION - FREQUENCY: FREQUENCY: INTERMITTENT

## 2021-08-25 ASSESSMENT — PAIN DESCRIPTION - ORIENTATION: ORIENTATION: RIGHT

## 2021-08-25 ASSESSMENT — PAIN DESCRIPTION - DESCRIPTORS: DESCRIPTORS: THROBBING;ACHING

## 2021-08-25 ASSESSMENT — PAIN SCALES - GENERAL: PAINLEVEL_OUTOF10: 2

## 2021-08-25 NOTE — PROGRESS NOTES
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 states she had a horrible morning  Broke a picture frame and not used to the humidity. Pt. states she removed KT yesterday and was able to go for 2 mile walk wiht seated and standing RB due to humidity and ankle pain. Pt. states she was trying to walk without putting foot out to side. Pt. states she still gets pain wtih bending then straightening her knee and inconsistent states sometimes not painful. Pain Screening  Patient Currently in Pain: Yes  Pain Assessment  Pain Assessment: 0-10  Pain Level: 2  Pain Type: Acute pain  Pain Location: Knee  Pain Orientation: Right  Pain Descriptors: Throbbing;Aching  Pain Frequency: Intermittent  Vital Signs  Patient Currently in Pain: Yes       Treatment Activities:   Manual therapy  Other: KT tape to R knee I strip to R knee at patella with medial pull 50% tension. I strip at patella with medial superior pull 50% tension for patellar tracking to dec pain. Also 25% tnesion at baker's cyst to dec edema               Ambulation  Ambulation?: Yes  Ambulation 1  Surface: carpet;level tile  Device: No Device  Assistance: Independent  Quality of Gait: occ clicking R knee not painful this date.   heel toe gait pattern   Distance: 440'  Comments: KT tape not donned   Stairs/Curb  Stairs?: No                Exercises  Exercise 2: BLE SLR; H5'' 2x 10   Exercise 3: BLE VMO SLR; H5'' 2 x10   Exercise 4: minisquaats ball between legs for cueing x 10 hold 3 sec  Exercise 5: standing gastroc stretch 30 sec x 2   Exercise 6: 90/90 HS stretch 30 sec x 2 B   Exercise 8: bridge with ball for adduction and abdominal bracing to start motion 2 x10  reps x 5 hold   Exercise 14: sidelying R IT band stretch; H30'' x 3   Exercise 20: recumbent bike; x 5 min      Modalities  Cryotherapy (Minutes\Location): pt issued ice to go. Manual therapy  Other: KT tape to R knee I strip to R knee at patella with medial pull 50% tension. I strip at patella with medial superior pull 50% tension for patellar tracking to dec pain. Also 25% tnesion at baker's cyst to dec edema                           Assessment:   Conditions Requiring Skilled Therapeutic Intervention  Body structures, Functions, Activity limitations: Decreased functional mobility ; Decreased ROM; Decreased strength  Assessment: Good focus on ambulation this date with better alignment of ankle  also heel toe pattern. Educated not to try to overcorrect or aim to do for short periods of time to avoid agravating ankle. Pt. with cont c/o intermittent R knee pain with bending and straightening her knee. Pt. deferred ankle # this date \"I don't think I can do it  I'm kind of tired after yesterday. \" Cont with KT tape ths date as provides good relief.   Pt. denies pain post.   Treatment Diagnosis: LBP, weakness R LE S/P R knee arthroscopy  Prognosis: Good  REQUIRES PT FOLLOW UP: Yes  Activity Tolerance  Activity Tolerance: Patient Tolerated treatment well        Goals:  Short term goals  Time Frame for Short term goals: 2 wks  Short term goal 1: I with HEP and report compliance with HEP 5/7 days or greater at least 1x per day  Short term goal 2: Pt report continued decrease in low back pain (4/10 or less) during ADLs once done with oral steroid medication  Long term goals  Time Frame for Long term goals : 4-5 weeks  Long term goal 1: Improve AROM lumbar spine all planes WFL without pain and R knee AROM comparable to L knee without pain  Long term goal 2: Improve R LE strength 4+/5 or greater to allow Pt to perform ADLs with increased ease including reciprocal stairclimbing  Long term goal 3: Improve Oswestry less than 20%  Long term goal 4: Pt perform ADLs including community ambulation, housework, return to rec activities (swimming) and tolerating standing and walking 20-30 min or greater with 2/10 or less low back and R knee pain 75% of the day or greater  Long term goal 5: I with advanced HEP to further improve ROM and strength  Patient Goals   Patient goals : Get back to walking normally and doing every day activities wthout pain    Plan:       Frequency and duration of tx  Days: 2  Weeks:  (4-6)     Therapy Time   Individual Concurrent Group Co-treatment   Time In  1200         Time Out  100         Minutes  [address] [place] [address], [facility]  License and [address] Number: [id number]

## 2021-08-30 ENCOUNTER — HOSPITAL ENCOUNTER (OUTPATIENT)
Dept: PHYSICAL THERAPY | Age: 66
Setting detail: THERAPIES SERIES
Discharge: HOME | End: 2021-08-30
Payer: MEDICARE

## 2021-08-30 PROCEDURE — 97530 THERAPEUTIC ACTIVITIES: CPT

## 2021-08-30 NOTE — PROGRESS NOTES
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 General Comment  Comments: DC today due to pt being indep with HEP           Treatment Activities:   Manual therapy  Other: KT tape was intact so reviewed with pt how to apply tape with the same technique and cut strips for pt with instructions on the strips of KT tape in regards to direction and tension %                          AROM RLE (degrees)  RLE General AROM: Right in sitting 0-126 AROM   Strength RLE  R Hip Flexion: 4/5;4+/5  R Hip Extension: 4/5  R Hip ABduction: 4/5;4+/5  R Knee Flexion: 4/5  R Knee Extension: 4/5;4+/5  R Ankle Dorsiflexion: 4+/5  R Ankle Plantar flexion: 4+/5             Manual therapy  Other: KT tape was intact so reviewed with pt how to apply tape with the same technique and cut strips for pt with instructions on the strips of KT tape in regards to direction and tension %                          Assessment:   Conditions Requiring Skilled Therapeutic Intervention  Body structures, Functions, Activity limitations: Decreased functional mobility ; Decreased ROM; Decreased strength  Assessment: Completed DC today due to pt reports that her back pain is much better and she reports also having a lot less right knee pain with most activity. Pt is now walking 2 miles (1 mile and then rest before completing the 2nd mile).  Pt reports getting much relief with the KT tape and that her tape on her right knee was still intact so PT reviewed how pt can apply to her right knee. PT demonstrated on pt and on PT how to apply and then cut strips and wrote instuctions on the tape as far as where to apply, which directions to apply and with how much tension. PT also discussed HEP and how to advance using pain as her guide. Pt agreeable with DC today and plans to continue on her own.    Treatment Diagnosis: LBP, weakness R LE S/P R knee arthroscopy  Prognosis: Good  REQUIRES PT FOLLOW UP: No               Goals:  Short term goals  Time Frame for Short term goals: 2 wks  Short term goal 1: I with HEP and report compliance with HEP 5/7 days or greater at least 1x per day (Goal met )  Short term goal 2: Pt report continued decrease in low back pain (4/10 or less) during ADLs once done with oral steroid medication (Gaol met - 1-2/10 pain )  Long term goals  Time Frame for Long term goals : 4-5 weeks  Long term goal 1: Improve AROM lumbar spine all planes WFL without pain and R knee AROM comparable to L knee without pain (Goal met - now able to pick item off floor )  Long term goal 2: Improve R LE strength 4+/5 or greater to allow Pt to perform ADLs with increased ease including reciprocal stairclimbing (Progressing towards goal )  Long term goal 3: Improve Oswestry less than 20% (Oswestry at DC=0/50=0% disability - goal met )  Long term goal 4: Pt perform ADLs including community ambulation, housework, return to rec activities (swimming) and tolerating standing and walking 20-30 min or greater with 2/10 or less low back and R knee pain 75% of the day or greater (ABle to walk 1 mile before sitting with no back pain )  Long term goal 5: I with advanced HEP to further improve ROM and strength (Goal met )  Patient Goals   Patient goals : Get back to walking normally and doing every day activities wthout pain    Plan:  DC to HEP            Therapy Time   Individual Concurrent Group Co-treatment   Time In

## 2021-09-09 ENCOUNTER — OFFICE VISIT (OUTPATIENT)
Dept: FAMILY MEDICINE CLINIC | Age: 66
End: 2021-09-09
Payer: MEDICARE

## 2021-09-09 VITALS
RESPIRATION RATE: 18 BRPM | DIASTOLIC BLOOD PRESSURE: 90 MMHG | BODY MASS INDEX: 45.06 KG/M2 | HEART RATE: 74 BPM | OXYGEN SATURATION: 95 % | HEIGHT: 66 IN | WEIGHT: 280.4 LBS | TEMPERATURE: 96.6 F | SYSTOLIC BLOOD PRESSURE: 134 MMHG

## 2021-09-09 DIAGNOSIS — E78.5 HYPERLIPIDEMIA, UNSPECIFIED HYPERLIPIDEMIA TYPE: ICD-10-CM

## 2021-09-09 DIAGNOSIS — M54.2 NECK PAIN: Primary | ICD-10-CM

## 2021-09-09 DIAGNOSIS — E66.01 MORBID OBESITY (HCC): ICD-10-CM

## 2021-09-09 DIAGNOSIS — Z11.59 NEED FOR HEPATITIS C SCREENING TEST: ICD-10-CM

## 2021-09-09 DIAGNOSIS — R03.0 ELEVATED BLOOD-PRESSURE READING WITHOUT DIAGNOSIS OF HYPERTENSION: ICD-10-CM

## 2021-09-09 DIAGNOSIS — D17.0 LIPOMA OF SCALP: ICD-10-CM

## 2021-09-09 PROBLEM — Z87.891 HISTORY OF TOBACCO USE: Chronic | Status: ACTIVE | Noted: 2021-09-09

## 2021-09-09 PROBLEM — M17.0 PRIMARY OSTEOARTHRITIS OF BOTH KNEES: Chronic | Status: ACTIVE | Noted: 2021-09-09

## 2021-09-09 PROBLEM — I83.93 VARICOSE VEINS OF BOTH LOWER EXTREMITIES: Chronic | Status: ACTIVE | Noted: 2021-09-09

## 2021-09-09 PROCEDURE — 99204 OFFICE O/P NEW MOD 45 MIN: CPT | Performed by: FAMILY MEDICINE

## 2021-09-09 RX ORDER — FLUOCINONIDE TOPICAL SOLUTION USP, 0.05% 0.5 MG/ML
SOLUTION TOPICAL 2 TIMES DAILY
COMMUNITY

## 2021-09-09 ASSESSMENT — ENCOUNTER SYMPTOMS
ABDOMINAL PAIN: 0
NAUSEA: 0
CHEST TIGHTNESS: 0
VOMITING: 0
SHORTNESS OF BREATH: 0

## 2021-09-09 NOTE — ASSESSMENT & PLAN NOTE
Will follow over time. Patient declined referral to dermatology for removal and definitive diagnosis.

## 2021-09-09 NOTE — ASSESSMENT & PLAN NOTE
Patient counseled on healthy dietary choices and the benefits of a lower salt and/or lower carbohydrate diet as appropriate. Patient also counseled on benefits of moderate intensity cardiovascular exercise for 20-30 minutes at least 3-5 days a week. Advice was given to make small changes over time, setting smaller achievable goals until recommended lifestyle changes are reached. Patient referred to dietitian to help with healthy eating plan to promote healthy weight loss. I reviewed with her the potential for starting medication to help with weight loss should initial conservative measures not provide sufficient progress.

## 2021-09-09 NOTE — PATIENT INSTRUCTIONS
Patient Education        Neck Strain or Sprain: Rehab Exercises  Introduction  Here are some examples of exercises for you to try. The exercises may be suggested for a condition or for rehabilitation. Start each exercise slowly. Ease off the exercises if you start to have pain. You will be told when to start these exercises and which ones will work best for you. How to do the exercises  Neck rotation   1. Sit in a firm chair, or stand up straight. 2. Keeping your chin level, turn your head to the right, and hold for 15 to 30 seconds. 3. Turn your head to the left and hold for 15 to 30 seconds. 4. Repeat 2 to 4 times to each side. Neck stretches   1. Look straight ahead, and tip your right ear to your right shoulder. Do not let your left shoulder rise up as you tip your head to the right. 2. Hold for 15 to 30 seconds. 3. Tilt your head to the left. Do not let your right shoulder rise up as you tip your head to the left. 4. Hold for 15 to 30 seconds. 5. Repeat 2 to 4 times to each side. Forward neck flexion   1. Sit in a firm chair, or stand up straight. 2. Bend your head forward. 3. Hold for 15 to 30 seconds. 4. Repeat 2 to 4 times. Lateral (side) bend strengthening   1. With your right hand, place your first two fingers on your right temple. 2. Start to bend your head to the side while using gentle pressure from your fingers to keep your head from bending. 3. Hold for about 6 seconds. 4. Repeat 8 to 12 times. 5. Switch hands and repeat the same exercise on your left side. Forward bend strengthening   1. Place your first two fingers of either hand on your forehead. 2. Start to bend your head forward while using gentle pressure from your fingers to keep your head from bending. 3. Hold for about 6 seconds. 4. Repeat 8 to 12 times. Neutral position strengthening   1. Using one hand, place your fingertips on the back of your head at the top of your neck.   2. Start to bend your head backward while using gentle pressure from your fingers to keep your head from bending. 3. Hold for about 6 seconds. 4. Repeat 8 to 12 times. Chin tuck   1. Lie on the floor with a rolled-up towel under your neck. Your head should be touching the floor. 2. Slowly bring your chin toward your chest.  3. Hold for a count of 6, and then relax for up to 10 seconds. 4. Repeat 8 to 12 times. Follow-up care is a key part of your treatment and safety. Be sure to make and go to all appointments, and call your doctor if you are having problems. It's also a good idea to know your test results and keep a list of the medicines you take. Where can you learn more? Go to https://Gloss48.News360. org and sign in to your [employer] account. Enter M679 in the Climateminder box to learn more about \"Neck Strain or Sprain: Rehab Exercises. \"     If you do not have an account, please click on the \"Sign Up Now\" link. Current as of: November 16, 2020               Content Version: 12.9  © 2239-1996 [employer]wise, [employer]. Care instructions adapted under license by [facility] [place] ([facility]). If you have questions about a medical condition or this instruction, always ask your healthcare professional. [address] any warranty or liability for your use of this information.

## 2021-09-09 NOTE — PROGRESS NOTES
Aubrie Womack (: 1955) is a 72 y.o. female, Established patient, who presents today for:    Chief Complaint   Patient presents with    New Patient     Patient presents today to establish care. Previous PCP was Dr. Dyan Mayers in [place]. Awaiting records currently.  Neck Pain     Pt reports off and on neck pain/stiffness x8 months. Pt reports neck pain is worse in the morning and is located on left side of neck.  Skin Problem     Pt reports bump on center of head x1 year. Pt declines any pain/discomfort on or around area. Was told by prior PCP to monitor it. ASSESSMENT/PLAN    1. Neck pain  Comments:  Likely muscular strain. Will manage conservatively with home exercises and heat/ice. Patient given formal order for PT if not improved in the next 2 weeks. Orders:  -     [facility]  2. Morbid obesity ([facility] [address]  Assessment & Plan:  Patient counseled on healthy dietary choices and the benefits of a lower salt and/or lower carbohydrate diet as appropriate. Patient also counseled on benefits of moderate intensity cardiovascular exercise for 20-30 minutes at least 3-5 days a week. Advice was given to make small changes over time, setting smaller achievable goals until recommended lifestyle changes are reached. Patient referred to dietitian to help with healthy eating plan to promote healthy weight loss. I reviewed with her the potential for starting medication to help with weight loss should initial conservative measures not provide sufficient progress. Orders:  -      [address]  -     TSH with Reflex; Future  3. Hyperlipidemia, unspecified hyperlipidemia type  -     CBC Auto Differential; Future  -     Comprehensive Metabolic Panel; Future  -     Lipid Panel; Future  -     TSH with Reflex; Future  4.  Elevated blood-pressure reading without diagnosis of hypertension  Comments:  Patient to keep close follow-up for nurse visit blood pressure check in 1 week for reassessment. 5. Lipoma of scalp  Assessment & Plan: Will follow over time. Patient declined referral to dermatology for removal and definitive diagnosis. 6. Need for hepatitis C screening test  -     Hepatitis C Antibody; Future      Return in about 2 months (around 11/9/2021) for nurse visit BP/pulse in 1 week, Chronic Disease check in 2 Months. SUBJECTIVE/OBJECTIVE:    HPI    Patient presents to [facility]. She was previously seen by Dr. Seth Vazquez in [place]. There is reported past history of osteoarthritis of the knees, hyperlipidemia, and bilateral varicose veins. She has recently finished a course of physical therapy for back and right knee pain with improvement. Patient reports her main concern today is left sided neck pain over the past 6-8 months rated 2-4/10 in severity, described as aching sensation and reported to be worse with movement of the neck. She denies any radiation of pain to the back or the upper extremities. She denies any paresthesias or weakness to the upper extremities. There was no reported preceding injury or change to normal day-to-day activity. Patient denies trying any relieving measures at home. Current Outpatient Medications on File Prior to Visit   Medication Sig Dispense Refill    Triamcinolone Acetonide (NASACORT ALLERGY 24HR NA) by Nasal route      fluocinonide (LIDEX) 0.05 % external solution Apply topically 2 times daily Apply topically 2 times daily.  GLUCOSAMINE-CHONDROITIN PO Take by mouth      COLLAGEN PO Take by mouth      acetaminophen (TYLENOL) 500 MG tablet Take 500 mg by mouth every 6 hours as needed for Pain       No current facility-administered medications on file prior to visit. Allergies   Allergen Reactions    Mushroom Extract Complex     Mushroom Extract Complex         Review of Systems   Constitutional: Negative for appetite change, chills, diaphoresis, fatigue, fever and unexpected weight change. Respiratory: Negative for chest tightness and shortness of breath. Cardiovascular: Negative for chest pain and palpitations. Gastrointestinal: Negative for abdominal pain, nausea and vomiting. Musculoskeletal: Positive for neck pain. Skin: Negative for rash. Neurological: Negative for dizziness, weakness, light-headedness, numbness and headaches. Vitals:  BP (!) 134/90 (Site: Left Upper Arm, Position: Sitting, Cuff Size: Large Adult)   Pulse 74   Temp 96.6 °F (35.9 °C) (Temporal)   Resp 18   Ht 5' 6\" (1.676 m)   Wt 280 lb 6.4 oz (127.2 kg)   SpO2 95%   BMI 45.26 kg/m²     Physical Exam  Vitals reviewed. Constitutional:       General: She is not in acute distress. Appearance: Normal appearance. She is obese. She is not ill-appearing or diaphoretic. HENT:      Head:     Cardiovascular:      Rate and Rhythm: Normal rate and regular rhythm. Heart sounds: No murmur heard. Pulmonary:      Effort: Pulmonary effort is normal. No respiratory distress. Breath sounds: Normal breath sounds. No wheezing, rhonchi or rales. Abdominal:      General: Bowel sounds are normal.      Palpations: Abdomen is soft. Tenderness: There is no abdominal tenderness. There is no guarding or rebound. Musculoskeletal:      Cervical back: No edema, erythema, signs of trauma, torticollis or crepitus. Pain with movement (With extension, lateral bending, and rotation) and muscular tenderness (Left-sided) present. No spinous process tenderness. Decreased range of motion (Limited extension, rotation, and lateral bending). Right lower leg: No edema. Left lower leg: No edema. Skin:     Findings: No rash. Neurological:      Mental Status: She is alert and oriented to person, place, and time. Psychiatric:         Mood and Affect: Mood normal.         Behavior: Behavior normal.         Thought Content:  Thought content normal.         Ortho Exam (If Applicable)              An electronic signature was used to authenticate this note.      Lorne Gurrola MD

## 2021-09-16 ENCOUNTER — NURSE ONLY (OUTPATIENT)
Dept: FAMILY MEDICINE CLINIC | Age: 66
End: 2021-09-16

## 2021-09-16 ENCOUNTER — HOSPITAL ENCOUNTER (OUTPATIENT)
Dept: LAB | Age: 66
Discharge: HOME | End: 2021-09-16
Payer: MEDICARE

## 2021-09-16 VITALS — OXYGEN SATURATION: 99 % | HEART RATE: 75 BPM | SYSTOLIC BLOOD PRESSURE: 142 MMHG | DIASTOLIC BLOOD PRESSURE: 90 MMHG

## 2021-09-16 DIAGNOSIS — E78.5 HYPERLIPIDEMIA, UNSPECIFIED HYPERLIPIDEMIA TYPE: ICD-10-CM

## 2021-09-16 DIAGNOSIS — Z11.59 NEED FOR HEPATITIS C SCREENING TEST: ICD-10-CM

## 2021-09-16 DIAGNOSIS — E66.01 MORBID OBESITY (HCC): ICD-10-CM

## 2021-09-16 LAB
ALBUMIN SERPL-MCNC: 4.2 G/DL (ref 3.5–4.6)
ALP BLD-CCNC: 73 U/L (ref 40–130)
ALT SERPL-CCNC: 15 U/L (ref 0–33)
ANION GAP SERPL CALCULATED.3IONS-SCNC: 15 MEQ/L (ref 9–15)
AST SERPL-CCNC: 19 U/L (ref 0–35)
BASOPHILS ABSOLUTE: 0.1 K/UL (ref 0–0.2)
BASOPHILS RELATIVE PERCENT: 1 %
BILIRUB SERPL-MCNC: 0.3 MG/DL (ref 0.2–0.7)
BUN BLDV-MCNC: 13 MG/DL (ref 8–23)
CALCIUM SERPL-MCNC: 9.3 MG/DL (ref 8.5–9.9)
CHLORIDE BLD-SCNC: 102 MEQ/L (ref 95–107)
CHOLESTEROL, TOTAL: 200 MG/DL (ref 0–199)
CO2: 23 MEQ/L (ref 20–31)
CREAT SERPL-MCNC: 0.62 MG/DL (ref 0.5–0.9)
EOSINOPHILS ABSOLUTE: 0.1 K/UL (ref 0–0.7)
EOSINOPHILS RELATIVE PERCENT: 2 %
GFR AFRICAN AMERICAN: >60
GFR NON-AFRICAN AMERICAN: >60
GLOBULIN: 2.7 G/DL (ref 2.3–3.5)
GLUCOSE BLD-MCNC: 90 MG/DL (ref 70–99)
HCT VFR BLD CALC: 44.7 % (ref 37–47)
HDLC SERPL-MCNC: 49 MG/DL (ref 40–59)
HEMOGLOBIN: 14.9 G/DL (ref 12–16)
HEPATITIS C ANTIBODY INTERPRETATION: NORMAL
LDL CHOLESTEROL CALCULATED: 132 MG/DL (ref 0–129)
LYMPHOCYTES ABSOLUTE: 2.9 K/UL (ref 1–4.8)
LYMPHOCYTES RELATIVE PERCENT: 48 %
MCH RBC QN AUTO: 30.1 PG (ref 27–31.3)
MCHC RBC AUTO-ENTMCNC: 33.3 % (ref 33–37)
MCV RBC AUTO: 90.4 FL (ref 82–100)
METAMYELOCYTES RELATIVE PERCENT: 1 %
MONOCYTES ABSOLUTE: 0.6 K/UL (ref 0.2–0.8)
MONOCYTES RELATIVE PERCENT: 10 %
NEUTROPHILS ABSOLUTE: 2.4 K/UL (ref 1.4–6.5)
NEUTROPHILS RELATIVE PERCENT: 38 %
PDW BLD-RTO: 14.4 % (ref 11.5–14.5)
PLATELET # BLD: 240 K/UL (ref 130–400)
PLATELET SLIDE REVIEW: ADEQUATE
POTASSIUM SERPL-SCNC: 4.7 MEQ/L (ref 3.4–4.9)
RBC # BLD: 4.95 M/UL (ref 4.2–5.4)
RBC # BLD: NORMAL 10*6/UL
SODIUM BLD-SCNC: 140 MEQ/L (ref 135–144)
TOTAL PROTEIN: 6.9 G/DL (ref 6.3–8)
TRIGL SERPL-MCNC: 97 MG/DL (ref 0–150)
TSH REFLEX: 0.67 UIU/ML (ref 0.44–3.86)
WBC # BLD: 6.1 K/UL (ref 4.8–10.8)

## 2021-09-16 PROCEDURE — 84443 ASSAY THYROID STIM HORMONE: CPT

## 2021-09-16 PROCEDURE — 85025 COMPLETE CBC W/AUTO DIFF WBC: CPT

## 2021-09-16 PROCEDURE — 36415 COLL VENOUS BLD VENIPUNCTURE: CPT

## 2021-09-16 PROCEDURE — 80053 COMPREHEN METABOLIC PANEL: CPT

## 2021-09-16 PROCEDURE — 80061 LIPID PANEL: CPT

## 2021-09-16 PROCEDURE — 86803 HEPATITIS C AB TEST: CPT

## 2021-10-12 ENCOUNTER — TELEPHONE (OUTPATIENT)
Dept: FAMILY MEDICINE CLINIC | Age: 66
End: 2021-10-12

## 2021-10-12 ENCOUNTER — IMMUNIZATION (OUTPATIENT)
Dept: FAMILY MEDICINE CLINIC | Age: 66
End: 2021-10-12
Payer: MEDICARE

## 2021-10-12 DIAGNOSIS — M17.0 PRIMARY OSTEOARTHRITIS OF BOTH KNEES: Primary | ICD-10-CM

## 2021-10-12 DIAGNOSIS — Z12.31 ENCOUNTER FOR SCREENING MAMMOGRAM FOR MALIGNANT NEOPLASM OF BREAST: ICD-10-CM

## 2021-10-12 DIAGNOSIS — I10 PRIMARY HYPERTENSION: ICD-10-CM

## 2021-10-12 DIAGNOSIS — Z23 NEED FOR VACCINATION: Primary | ICD-10-CM

## 2021-10-12 DIAGNOSIS — Z12.4 SCREENING FOR CERVICAL CANCER: Primary | ICD-10-CM

## 2021-10-12 PROCEDURE — 90694 VACC AIIV4 NO PRSRV 0.5ML IM: CPT | Performed by: FAMILY MEDICINE

## 2021-10-12 PROCEDURE — G0008 ADMIN INFLUENZA VIRUS VAC: HCPCS | Performed by: FAMILY MEDICINE

## 2021-10-12 NOTE — PROGRESS NOTES
Patient in the office today for BP check  Telephone call sent to PCP with information. Vaccine Information Sheet, \"Influenza - Inactivated\"  given to Jenni Canavan, or parent/legal guardian of  Jenni Canavan and verbalized understanding. Patient responses:    Have you ever had a reaction to a flu vaccine? No  Are you able to eat eggs without adverse effects? Yes  Do you have any current illness? No  Have you ever had Guillian Golden Syndrome? No    Flu vaccine given per order. Please see immunization tab.

## 2021-10-12 NOTE — TELEPHONE ENCOUNTER
----- Message from Tata Goodwin sent at 10/11/2021  3:45 PM EDT -----  Subject: Referral Request    QUESTIONS   Reason for referral request? patient is calling to get an orthopedic   referral.   Has the physician seen you for this condition before? No   Preferred Specialist (if applicable)? Do you already have an appointment scheduled? No  Additional Information for Provider?   ---------------------------------------------------------------------------  --------------  CALL BACK INFO  What is the best way for the office to contact you? OK to leave message on   voicemail  Preferred Call Back Phone Number?  [phone]

## 2021-10-12 NOTE — TELEPHONE ENCOUNTER
Patient in the office today for a nurse BP check. Patient states that she does not check BP at home      Patient is adherent to a low sodium diet. Patient does not exercise for at least 20 min 3-5 days per week      BP today was 152/100.   Pulse: 86  SpO2: 95    2nd BP:  138/90      BP Readings from Last 3 Encounters:   09/16/21 (!) 142/90   09/09/21 (!) 134/90   07/27/21 120/70         Please advise

## 2021-10-12 NOTE — TELEPHONE ENCOUNTER
Blood pressure is elevated and may warrant medication.   Will advise patient to try to follow-up with Dr. Agatha Andrade this week if possible to consider addition of blood pressure lowering medication

## 2021-10-12 NOTE — TELEPHONE ENCOUNTER
I called patient and left a voicemail for her to call back regarding ortho referral and appointment for a physical.

## 2021-10-13 PROBLEM — I10 PRIMARY HYPERTENSION: Status: ACTIVE | Noted: 2021-10-13

## 2021-10-13 NOTE — TELEPHONE ENCOUNTER
Advise patient that blood pressure remains elevated. We have enough data to make a diagnosis of hypertension. She should be on a medication for long-term management. I would like to start her on a low-dose of hydrochlorothiazide 12.5 mg once daily. This is also a medication that would also help with any lower extremity swelling that could result from varicose veins. Please ask patient if she is willing to take the medication and send message back to me with reply.

## 2021-10-13 NOTE — TELEPHONE ENCOUNTER
Patient does not want to start that medication until she talks you. AWV scheduled 10/21/21. Also, referral for ortho is due to her knees. She was getting therapy in previous state.

## 2021-10-14 NOTE — TELEPHONE ENCOUNTER
1st attempt to reach patient. Called patient @ [phone] left message on machine for patient to return call during normal business hours of 8:30 AM and 5 PM @ [phone] option 2.

## 2021-10-15 NOTE — TELEPHONE ENCOUNTER
Patient made aware and given referral information  Patient reports she wanted BP cuff for when she comes in to the office because she was always told to use an XL cuff but we have only been using a large cuff. I advised her that we do have XL cuff and we do not need to send her a script for this. She understood. Closing this encounter, nothing further needed.

## 2021-10-15 NOTE — TELEPHONE ENCOUNTER
Advise patient that her Annual Wellness Visit scheduled on 10/21/2021 takes the place of a routine physical with Medicare coverage. I have placed a referral to Dr. Quentin Guajardo a female gynecologist in our same building to do her routine Pap test and a well woman visit with breast exam.  I have also placed an order for her routine mammogram.     I have also placed in her chart a referral to Dr. Shante Irvin, an orthopedic surgeon in our same building to review her concerns regarding knee pain. Please pend the order for extra large BP cuff and I will sign.

## 2021-10-15 NOTE — TELEPHONE ENCOUNTER
Pt called stating she has a few questions to go over with a Nurse. She stated she has an Appointment on 10-21-21 but states she is not sure if she wants to do that Visit and wants a Physical and also to have her Knee Checked. She states she had Surgery on her Knee and now needs a Check up but she is not sure if she can come here to have her knee checked or if she needs to go to an Orthopedic Doctor? She also wanted to find out if she can have an Extra Large B/P Cuff Ordered for her because she only has a Large one and it is too tight and Leaves Bruises on her Arm after she checks her B/P? She stated she has always had a Female Doctor and does not feel Comfortable having a PAP with her Physical with a Male Doctor and asked if their is a Female Doctor or CNP that could do that part of her Physical? Please call pt to answer her Questions. Thank you!

## 2021-10-21 ENCOUNTER — OFFICE VISIT (OUTPATIENT)
Dept: FAMILY MEDICINE CLINIC | Age: 66
End: 2021-10-21
Payer: MEDICARE

## 2021-10-21 VITALS
BODY MASS INDEX: 44.2 KG/M2 | RESPIRATION RATE: 20 BRPM | SYSTOLIC BLOOD PRESSURE: 146 MMHG | DIASTOLIC BLOOD PRESSURE: 90 MMHG | OXYGEN SATURATION: 95 % | TEMPERATURE: 96.4 F | HEIGHT: 66 IN | WEIGHT: 275 LBS | HEART RATE: 70 BPM

## 2021-10-21 DIAGNOSIS — E66.01 MORBID OBESITY (HCC): ICD-10-CM

## 2021-10-21 DIAGNOSIS — Z12.11 SCREENING FOR COLON CANCER: ICD-10-CM

## 2021-10-21 DIAGNOSIS — Z00.00 WELCOME TO MEDICARE PREVENTIVE VISIT: Primary | ICD-10-CM

## 2021-10-21 DIAGNOSIS — Z87.891 PERSONAL HISTORY OF TOBACCO USE: Chronic | ICD-10-CM

## 2021-10-21 DIAGNOSIS — I10 PRIMARY HYPERTENSION: ICD-10-CM

## 2021-10-21 DIAGNOSIS — Z13.820 SCREENING FOR OSTEOPOROSIS: ICD-10-CM

## 2021-10-21 DIAGNOSIS — Z91.199 PERSONAL HISTORY OF NONCOMPLIANCE WITH MEDICAL TREATMENT, PRESENTING HAZARDS TO HEALTH: ICD-10-CM

## 2021-10-21 DIAGNOSIS — Z78.0 POSTMENOPAUSAL: ICD-10-CM

## 2021-10-21 DIAGNOSIS — Z23 NEED FOR VACCINATION: ICD-10-CM

## 2021-10-21 PROBLEM — Z91.89 10 YEAR RISK OF MI OR STROKE 7.5% OR GREATER: Chronic | Status: ACTIVE | Noted: 2021-10-21

## 2021-10-21 PROCEDURE — G0403 EKG FOR INITIAL PREVENT EXAM: HCPCS | Performed by: FAMILY MEDICINE

## 2021-10-21 PROCEDURE — G0402 INITIAL PREVENTIVE EXAM: HCPCS | Performed by: FAMILY MEDICINE

## 2021-10-21 PROCEDURE — 99173 VISUAL ACUITY SCREEN: CPT | Performed by: FAMILY MEDICINE

## 2021-10-21 PROCEDURE — G0296 VISIT TO DETERM LDCT ELIG: HCPCS | Performed by: FAMILY MEDICINE

## 2021-10-21 ASSESSMENT — PATIENT HEALTH QUESTIONNAIRE - PHQ9
2. FEELING DOWN, DEPRESSED OR HOPELESS: 1
SUM OF ALL RESPONSES TO PHQ QUESTIONS 1-9: 1
SUM OF ALL RESPONSES TO PHQ9 QUESTIONS 1 & 2: 1
1. LITTLE INTEREST OR PLEASURE IN DOING THINGS: 0
SUM OF ALL RESPONSES TO PHQ QUESTIONS 1-9: 1
SUM OF ALL RESPONSES TO PHQ QUESTIONS 1-9: 1

## 2021-10-21 ASSESSMENT — VISUAL ACUITY
OD_CC: 20/40
OS_CC: 20/30

## 2021-10-21 ASSESSMENT — LIFESTYLE VARIABLES: HOW OFTEN DO YOU HAVE A DRINK CONTAINING ALCOHOL: 0

## 2021-10-21 NOTE — ASSESSMENT & PLAN NOTE
Patient with continued elevated blood pressure readings. I reviewed with her again the importance of long-term blood pressure control and the risks of serious morbidity and even mortality with uncontrolled blood pressure over time. Patient voices understanding, but still declines treatment. I stressed with her the importance of a low-salt diet and she was given handout with information regarding DASH diet today in the office. We reviewed the benefits of routine moderate intensity exercise for 150 minutes/week.   We will reassess in 3 months

## 2021-10-21 NOTE — ASSESSMENT & PLAN NOTE
Patient reports her insurance does not cover a dietitian based on her current medical diagnoses. I reviewed with her the benefits of a lower salt and lower carbohydrate diet and the benefits of routine moderate intensity exercise for 150 minutes/week. Patient will continue making her best efforts with lifestyle changes and we will continue to monitor over time.

## 2021-10-21 NOTE — ASSESSMENT & PLAN NOTE
Low Dose CT (LDCT) Lung Screening criteria met:     Age 55-77(Medicare) or 50-80 ([facility])   Pack year smoking >30 (Medicare) or >20 ([facility])   Still smoking or less than 15 year since quit   No sign or symptoms of lung cancer   > 11 months since last LDCT     Risks and benefits of lung cancer screening with LDCT scans discussed:    Significance of positive screen - False-positive LDCT results often occur. 95% of all positive results do not lead to a diagnosis of cancer. Usually further imaging can resolve most false-positive results; however, some patients may require invasive procedures. Over diagnosis risk - 10% to 12% of screen-detected lung cancer cases are over diagnosed--that is, the cancer would not have been detected in the patient's lifetime without the screening. Need for follow up screens annually to continue lung cancer screening effectiveness     Risks associated with radiation from annual LDCT- Radiation exposure is about the same as for a mammogram, which is about 1/3 of the annual background radiation exposure from everyday life. Starting screening at age 54 is not likely to increase cancer risk from radiation exposure. Patients with comorbidities resulting in life expectancy of < 10 years, or that would preclude treatment of an abnormality identified on CT, should not be screened due to lack of benefit.     To obtain maximal benefit from this screening, smoking cessation and long-term abstinence from smoking is critical

## 2021-10-21 NOTE — ASSESSMENT & PLAN NOTE
Patient declines medication management of hypertension. I reviewed with her risks of serious morbidity with continued elevated BP over time. She voiced understanding and still declined any further treatment. She was advised to contact the office should she change her mind in the future about treatment.

## 2021-10-21 NOTE — PATIENT INSTRUCTIONS
Personalized Preventive Plan for Sushma Tavera - 10/21/2021  Medicare offers a range of preventive health benefits. Some of the tests and screenings are paid in full while other may be subject to a deductible, co-insurance, and/or copay. Some of these benefits include a comprehensive review of your medical history including lifestyle, illnesses that may run in your family, and various assessments and screenings as appropriate. After reviewing your medical record and screening and assessments performed today your provider may have ordered immunizations, labs, imaging, and/or referrals for you. A list of these orders (if applicable) as well as your Preventive Care list are included within your After Visit Summary for your review. Other Preventive Recommendations:    · A preventive eye exam performed by an eye specialist is recommended every 1-2 years to screen for glaucoma; cataracts, macular degeneration, and other eye disorders. · A preventive dental visit is recommended every 6 months. · Try to get at least 150 minutes of exercise per week or 10,000 steps per day on a pedometer . · Order or download the FREE \"Exercise & Physical Activity: Your Everyday Guide\" from [employer]. Call 0-613.438.8496 or search [employer] online. · You need 9683-7310 mg of calcium and 1502-4525 IU of vitamin D per day. It is possible to meet your calcium requirement with diet alone, but a vitamin D supplement is usually necessary to meet this goal.  · When exposed to the sun, use a sunscreen that protects against both UVA and UVB radiation with an SPF of 30 or greater. Reapply every 2 to 3 hours or after sweating, drying off with a towel, or swimming. · Always wear a seat belt when traveling in a car. Always wear a helmet when riding a bicycle or motorcycle. Heart-Healthy Diet   Sodium, Fat, and Cholesterol Controlled Diet       What Is a Heart Healthy Diet?    A heart-healthy diet is one that limits sodium , certain types of fat , and cholesterol . This type of diet is recommended for:   People with any form of cardiovascular disease (eg, coronary heart disease , peripheral vascular disease , previous heart attack , previous stroke )   People with risk factors for cardiovascular disease, such as high blood pressure , high cholesterol , or diabetes   Anyone who wants to lower their risk of developing cardiovascular disease   Sodium    Sodium is a mineral found in many foods. In general, most people consume much more sodium than they need. Diets high in sodium can increase blood pressure and lead to edema (water retention). On a heart-healthy diet, you should consume no more than 2,300 mg (milligrams) of sodium per dayabout the amount in one teaspoon of table salt. The foods highest in sodium include table salt (about 50% sodium), processed foods, convenience foods, and preserved foods. Cholesterol    Cholesterol is a fat-like, waxy substance in your blood. Our bodies make some cholesterol. It is also found in animal products, with the highest amounts in fatty meat, egg yolks, whole milk, cheese, shellfish, and organ meats. On a heart-healthy diet, you should limit your cholesterol intake to less than 200 mg per day. It is normal and important to have some cholesterol in your bloodstream. But too much cholesterol can cause plaque to build up within your arteries, which can eventually lead to a heart attack or stroke. The two types of cholesterol that are most commonly referred to are:   Low-density lipoprotein (LDL) cholesterol  Also known as bad cholesterol, this is the cholesterol that tends to build up along your arteries. Bad cholesterol levels are increased by eating fats that are saturated or hydrogenated. Optimal level of this cholesterol is less than 100. Over 130 starts to get risky for heart disease.    High-density lipoprotein (HDL) cholesterol  Also known as good cholesterol, this type of cholesterol actually carries cholesterol away from your arteries and may, therefore, help lower your risk of having a heart attack. You want this level to be high (ideally greater than 60). It is a risk to have a level less than 40. You can raise this good cholesterol by eating olive oil, canola oil, avocados, or nuts. Exercise raises this level, too. Fat    Fat is calorie dense and packs a lot of calories into a small amount of food. Even though fats should be limited due to their high calorie content, not all fats are bad. In fact, some fats are quite healthful. Fat can be broken down into four main types. The good-for-you fats are:   Monounsaturated fat  found in oils such as olive and canola, avocados, and nuts and natural nut butters; can decrease cholesterol levels, while keeping levels of HDL cholesterol high   Polyunsaturated fat  found in oils such as safflower, sunflower, soybean, corn, and sesame; can decrease total cholesterol and LDL cholesterol   Omega-3 fatty acids  particularly those found in fatty fish (such as salmon, trout, tuna, mackerel, herring, and sardines); can decrease risk of arrhythmias, decrease triglyceride levels, and slightly lower blood pressure   The fats that you want to limit are:   Saturated fat  found in animal products, many fast foods, and a few vegetables; increases total blood cholesterol, including LDL levels   Animal fats that are saturated include: butter, lard, whole-milk dairy products, meat fat, and poultry skin   Vegetable fats that are saturated include: hydrogenated shortening, palm oil, coconut oil, cocoa butter   Hydrogenated or trans fat  found in margarine and vegetable shortening, most shelf stable snack foods, and fried foods; increases LDL and decreases HDL     It is generally recommended that you limit your total fat for the day to less than 30% of your total calories.  If you follow an 1800-calorie heart healthy diet, for example, this would mean 60 grams of fat or less per day. Saturated fat and trans fat in your diet raises your blood cholesterol the most, much more than dietary cholesterol does. For this reason, on a heart-healthy diet, less than 7% of your calories should come from saturated fat and ideally 0% from trans fat. On an 1800-calorie diet, this translates into less than 14 grams of saturated fat per day, leaving 46 grams of fat to come from mono- and polyunsaturated fats.    Food Choices on a Heart Healthy Diet   Food Category   Foods Recommended   Foods to Avoid   Grains   Breads and rolls without salted tops Most dry and cooked cereals Unsalted crackers and breadsticks Low-sodium or homemade breadcrumbs or stuffing All rice and pastas   Breads, rolls, and crackers with salted tops High-fat baked goods (eg, muffins, donuts, pastries) Quick breads, self-rising flour, and biscuit mixes Regular bread crumbs Instant hot cereals Commercially prepared rice, pasta, or stuffing mixes   Vegetables   Most fresh, frozen, and low-sodium canned vegetables Low-sodium and salt-free vegetable juices Canned vegetables if unsalted or rinsed   Regular canned vegetables and juices, including sauerkraut and pickled vegetables Frozen vegetables with sauces Commercially prepared potato and vegetable mixes   Fruits   Most fresh, frozen, and canned fruits All fruit juices   Fruits processed with salt or sodium   Milk   Nonfat or low-fat (1%) milk Nonfat or low-fat yogurt Cottage cheese, low-fat ricotta, cheeses labeled as low-fat and low-sodium   Whole milk Reduced-fat (2%) milk Malted and chocolate milk Full fat yogurt Most cheeses (unless low-fat and low salt) Buttermilk (no more than 1 cup per week)   Meats and Beans   Lean cuts of fresh or frozen beef, veal, lamb, or pork (look for the word loin) Fresh or frozen poultry without the skin Fresh or frozen fish and some shellfish Egg whites and egg substitutes (Limit whole eggs to three per week) Tofu Nuts or seeds (unsalted, dry-roasted), low-sodium peanut butter Dried peas, beans, and lentils   Any smoked, cured, salted, or canned meat, fish, or poultry (including osman, chipped beef, cold cuts, hot dogs, sausages, sardines, and anchovies) Poultry skins Breaded and/or fried fish or meats Canned peas, beans, and lentils Salted nuts   Fats and Oils   Olive oil and canola oil Low-sodium, low-fat salad dressings and mayonnaise   Butter, margarine, coconut and palm oils, osman fat   Snacks, Sweets, and Condiments   Low-sodium or unsalted versions of broths, soups, soy sauce, and condiments Pepper, herbs, and spices; vinegar, lemon, or lime juice Low-fat frozen desserts (yogurt, sherbet, fruit bars) Sugar, cocoa powder, honey, syrup, jam, and preserves Low-fat, trans-fat free cookies, cakes, and pies Jaya and animal crackers, fig bars, aura snaps   High-fat desserts Broth, soups, gravies, and sauces, made from instant mixes or other high-sodium ingredients Salted snack foods Canned olives Meat tenderizers, seasoning salt, and most flavored vinegars   Beverages   Low-sodium carbonated beverages Tea and coffee in moderation Soy milk   Commercially softened water   Suggestions   Make whole grains, fruits, and vegetables the base of your diet. Choose heart-healthy fats such as canola, olive, and flaxseed oil, and foods high in heart-healthy fats, such as nuts, seeds, soybeans, tofu, and fish. Eat fish at least twice per week; the fish highest in omega-3 fatty acids and lowest in mercury include salmon, herring, mackerel, sardines, and canned chunk light tuna. If you eat fish less than twice per week or have high triglycerides, talk to your doctor about taking fish oil supplements. Read food labels.    For products low in fat and cholesterol, look for fat free, low-fat, cholesterol free, saturated fat free, and trans fat freeAlso scan the Nutrition Facts Label, which lists saturated fat, trans fat, and cholesterol amounts. For products low in sodium, look for sodium free, very low sodium, low sodium, no added salt, and unsalted   Skip the salt when cooking or at the table; if food needs more flavor, get creative and try out different herbs and spices. Garlic and onion also add substantial flavor to foods. Trim any visible fat off meat and poultry before cooking, and drain the fat off after rodgers. Use cooking methods that require little or no added fat, such as grilling, boiling, baking, poaching, broiling, roasting, steaming, stir-frying, and sauting. Avoid fast food and convenience food. They tend to be high in saturated and trans fat and have a lot of added salt. Talk to a registered dietitian for individualized diet advice. Last Reviewed: March 2011 Elenita Sierra MS, MPH, RD   Updated: 3/29/2011   ·   Patient information: Weight loss treatments    INTRODUCTION -- Obesity is a major international problem, and Americans are among the heaviest people in the world. The percentage of obese people in the [place] has risen steadily. Many people find that although they initially lose weight by dieting, they quickly regain the weight after the diet ends. Because it so hard to keep weight off over time, it is important to have as much information and support as possible before starting a diet. You are most likely to be successful in losing weight and keeping it off when you believe that your body weight can be controlled. STARTING A WEIGHT LOSS PROGRAM -- Some people like to talk to their doctor or nurse to get help choosing the best plan, monitoring progress, and getting advice and support along the way. To know what treatment (or combination of treatments) will work best, determine your body mass index (BMI) and waist circumference (measurement). The BMI is calculated from your height and weight.   A person with a BMI between 25 and 29.9 is considered overweight   A person with a BMI of 30 or greater is considered to be obese  A waist circumference greater than 35 inches (88 cm) in women and 40 inches (102 cm) in men increases the risk of obesity-related complications, such as heart disease and diabetes. People who are obese and who have a larger waist size may need more aggressive weight loss treatment than others. Talk to your doctor or nurse for advice. Types of treatment -- Based on your measurements and your medical history, your doctor or nurse can determine what combination of weight loss treatments would work best for you. Treatments may include changes in lifestyle, exercise, dieting, and, in some cases, weight loss medicines or weight loss surgery. Weight loss surgery, also called bariatric surgery, is reserved for people with severe obesity who have not responded to other weight loss treatments. SETTING A WEIGHT LOSS GOAL -- It is important to set a realistic weight loss goal. Your first goal should be to avoid gaining more weight and staying at your current weight (or within 5 percent). Many people have a \"dream\" weight that is difficult or impossible to achieve. People at high risk of developing diabetes who are able to lose 5 percent of their body weight and maintain this weight will reduce their risk of developing diabetes by about 50 percent and reduce their blood pressure. This is a success. Losing more than 15 percent of your body weight and staying at this weight is an extremely good result, even if you never reach your \"dream\" or \"ideal\" weight. LIFESTYLE CHANGES -- Programs that help you to change your lifestyle are usually run by psychologists or other professionals. The goals of lifestyle changes are to help you change your eating habits, become more active, and be more aware of how much you eat and exercise, helping you to make healthier choices. This type of treatment can be broken down into three steps:   The triggers that make you want to eat   Eating   What happens after you eat  Triggers to eat -- Determining what triggers you to eat involves figuring out what foods you eat and where and when you eat. To figure out what triggers you to eat, keep a record for a few days of everything you eat, the places where you eat, how often you eat, and the emotions you were feeling when you ate. For some people, the trigger is related to a certain time of day or night. For others, the trigger is related to a certain place, like sitting at a desk working. Eating -- You can change your eating habits by breaking the chain of events between the trigger for eating and eating itself. There are many ways to do this. For instance, you can:  Limit where you eat to a few places (eg, dining room)   Restrict the number of utensils (eg, only a fork) used for eating   Drink a sip of water between each bite   Chew your food a certain number of times   Get up and stop eating every few minutes  What happens after you eat -- Rewarding yourself for good eating behaviors can help you to develop better habits. This is not a reward for weight loss; instead, it is a reward for changing unhealthy behaviors. Do not use food as a reward. Some people find money, clothing, or personal care (eg, a hair cut, manicure, or massage) to be effective rewards. Treat yourself immediately after making better eating choices to reinforce the value of the good behavior. You need to have clear behavior goals, and you must have a time frame for reaching your goals. Reward small changes along the way to your final goal.  Other factors that contribute to successful weight loss -- Changing your behavior involves more than just changing unhealthy eating habits; it also involves finding people around you to support your weight loss, reducing stress, and learning to be strong when tempted by food. Establish a \"rick\" system -- Having a friend or family member available to provide support and reinforce good behavior is very helpful. The support person needs to understand your goals. Learn to be strong -- Learning to be strong when tempted by food is an important part of losing weight. As an example, you will need to learn how to say \"no\" and continue to say no when urged to eat at parties and social gatherings. Develop strategies for events before you go, such as eating before you go or taking low-calorie snacks and drinks with you. Develop a support system -- Having a support system is helpful when losing weight. This is why many [employer] groups are successful. Family support is also essential; if your family does not support your efforts to lose weight, this can slow your progress or even keep you from losing weight. Positive thinking -- People often have conversations with themselves in their head; these conversations can be positive or negative. If you eat a piece of cake that was not planned, you may respond by thinking, \"Oh, you stupid idiot, you've blown your diet! \" and as a result, you may eat more cake. A positive thought for the same event could be, \"Well, I ate cake when it was not on my plan. Now I should do something to get back on track. \" A positive approach is much more likely to be successful than a negative one. Reduce stress -- Although stress is a part of everyday life, it can trigger uncontrolled eating in some people. It is important to find a way to get through these difficult times without eating or by eating low-calorie food, like raw vegetables. It may be helpful to imagine a relaxing place that allows you to temporarily escape from stress. With deep breaths and closed eyes, you can imagine this relaxing place for a few minutes. Self-help programs -- Self-help programs like [employer] Watchers®, Overeaters Anonymous®, and Take Off Andrea (TOPS)© work for some people.  As with all weight loss programs, you are most likely to be successful with these plans if you make long-term changes in how you eat.  CHOOSING A DIET -- A calorie is a unit of energy found in food. Your body needs calories to function. The goal of any diet is to burn up more calories than you eat. How quickly you lose weight depends upon several factors, such as your age, gender, and starting weight. Older people have a slower metabolism than young people, so they lose weight more slowly. Men lose more weight than women of similar height and weight when dieting because they use more energy. People who are extremely overweight lose weight more quickly than those who are only mildly overweight. Try not to drink alcohol or drinks with added sugar, and most sweets (candy, cakes, cookies), since they rarely contain important nutrients. Portion-controlled diets -- One simple way to diet is to buy packaged foods, like frozen low-calorie meals or meal-replacement canned drinks. A typical meal plan for one day may include:  A meal-replacement drink or breakfast bar for breakfast   A meal-replacement drink or a frozen low-calorie (250 to 350 calories) meal for lunch   A frozen low-calorie meal or other prepackaged, calorie-controlled meal, along with extra vegetables for dinner  This would give you 1000 to 1500 calories per day. Low-fat diet -- To reduce the amount of fat in your diet, you can:  Eat low-fat foods. Low-fat foods are those that contain less than 30 percent of calories from fat. Fat is listed on the food facts label (figure 1). Count fat grams. For a 1500 calorie diet, this would mean about 45 g or fewer of fat per day. Low-carbohydrate diet -- Low- and very-low-carbohydrate diets (eg, Atkins diet, [employer]) have become popular ways to lose weight quickly.   With a very-low-carbohydrate diet, you eat between 0 and 60 grams of carbohydrates per day (a standard diet contains 200 to 300 grams of carbohydrates)   With a low-carbohydrate diet, you eat between 60 and 130 grams of carbohydrates per day  Carbohydrates are found in fruits, vegetables, and grains (including breads, rice, pasta, and cereal), alcoholic beverages, and in dairy products. Meat and fish do not contain carbohydrates. Side effects of very-low-carbohydrate diets can include constipation, headache, bad breath, muscle cramps, diarrhea, and weakness. Mediterranean diet -- The term \"Mediterranean diet\" refers to a way of eating that is common in [place] regions around the [facility]. Although there is some variation in Mediterranean diets, there are some similarities. Most Mediterranean diets include:  A high level of monounsaturated fats (from olive or canola oil, walnuts, pecans, almonds) and a low level of saturated fats (from butter)   A high amount of vegetables, fruits, legumes, and grains (7 to 10 servings of fruits and vegetables per day)   A moderate amount of milk and dairy products, mostly in the form of cheese. Use low-fat dairy products (skim milk, fat-free yogurt, low-fat cheese). A relatively low amount of red meat and meat products. Substitute fish or poultry for red meat. For those who drink alcohol, a modest amount (mainly as red wine) may help to protect against cardiovascular disease. A modest amount is up to one (4 ounce) glass per day for women and up to two glasses per day for men. Which diet is best? -- Studies have compared different diets, including:  Very-low-carbohydrate (Atkins)   Macronutrient balance controlling glycemic load (Zone®)   Reduced-calorie (Weight Watchers®)   Very-low-fat (Ornish)  No one diet is \"best\" for weight loss. Any diet will help you to lose weight if you stick with the diet. Therefore, it is important to choose a diet that includes foods you like. Fad diets -- Fad diets often promise quick weight loss (more than 1 to 2 pounds per week) and may claim that you do not need to exercise or give up favorite foods. Some fad diets cost a lot of money, because you have to pay for seminars or pills. Fad diets generally lack any scientific evidence that they are safe and effective, but instead rely on \"before\" and \"after\" photos or testimonials. Diets that sound too good to be true usually are. These plans are a waste of time and money and are not recommended. A doctor, nurse, or nutritionist can help you find a safe and effective way to lose weight and keep it off. WEIGHT LOSS MEDICINES -- Taking a weight loss medicine may be helpful when used in combination with diet, exercise, and lifestyle changes. However, it is important to understand the risks and benefits of these medicines. It is also important to be realistic about your goal weight using a weight loss medicine; you may not reach your \"dream\" weight, but you may be able to reduce your risk of diabetes or heart disease. Weight loss medicines may be recommended for people who have not been able to lose weight with diet and exercise who have a:  BMI of 30 or more    BMI between 27 and 29.9 and have other medical problems, such as diabetes, high cholesterol, or high blood pressure  Two weight loss medicines are approved in the [place] for long-term use. These are sibutramine and orlistat. Other weight loss medicines (phentermine, diethylpropion) are available but are only approved for short-term use (up to 12 weeks). Sibutramine -- Sibutramine (Meridia®, Reductil®) is a medicine that reduces your appetite. In people who take the medicine for one year, the average weight loss is 10 percent of the initial body weight (5 percent more than those who took a placebo treatment). Side effects of sibutramine include insomnia, dry mouth, and constipation. Increases in blood pressure can occur. Therefore, blood pressure is usually monitored during treatment. There is no evidence that sibutramine causes heart or lung problems (like dexfenfluramine and fenfluramine (Phen/Fen)).  However, experts agree that sibutramine should not used by people with coronary heart disease, heart failure, uncontrolled hypertension, stroke, irregular heart rhythms, or peripheral vascular disease (poor circulation in the legs). Orlistat -- Orlistat (Xenical® 120 mg capsules) is a medicine that reduces the amount of fat your body absorbs from the foods you eat. A lower-dose version is now available without a prescription (Brooks® 60 mg capsules) in many countries, including the [place]. The medicine is recommended three times per day, taken with a meal; you can skip a dose if you skip a meal or if the meal contains no fat. After one year of treatment with orlistat, the average weight loss is approximately 8 to 10 percent of initial body weight (4 percent more than in those who took a placebo). Cholesterol levels often improve, and blood pressure sometimes falls. In people with diabetes, orlistat may help control blood sugar levels. Side effects occur in 15 to 10 percent of people and may include stomach cramps, gas, diarrhea, leakage of stool, or oily stools. These problems are more likely when you take orlistat with a high-fat meal (if more than 30 percent of calories in the meal are from fat). Side effects usually improve as you learn to avoid high-fat foods. Severe liver injury has been reported rarely in patients taking orlistat, but it is not known if orlistat caused the liver problems. Diet supplements -- Diet supplements are widely used by people who are trying to lose weight, although the safety and efficacy of these supplements are often unproven. A few of the more common diet supplements are discussed below; none of these are recommended because they have not been studied carefully, and there is no proof they are safe or effective. Chitosan and wheat dextrin are ineffective for weight loss, and their use is not recommended. Ephedra, a compound related to ephedrine, is no longer available in the [place] due to safety concerns.  Many nonprescription diet pills previously contained ephedra. Although some studies have shown that ephedra helps with weight loss, there can be serious side effects (psychiatric symptoms, palpitations, and stomach upset), including death. There are not enough data about the safety and efficacy of chromium, ginseng, glucomannan, green tea, hydroxycitric acid, L carnitine, psyllium, pyruvate supplements, McKenna wort, and conjugated linoleic acid. Two supplements from [facility], [address] [place] (also known as the Wrightgiorgio Cooper 15 pill) and Herbathin dietary supplement, have been shown to contain prescription drugs. Hoodia gordonii is a dietary supplement derived from a plant in [place]. It is not recommended because there is no proof that it is safe or effective. Bitter orange (Citrus aurantium) can increase your heart rate and blood pressure and is not recommended. SHOULD I HAVE SURGERY TO LOSE WEIGHT? -- Weight loss surgery is recommended ONLY for people with one of the following:  Severe obesity (body mass index above 40) (calculator 1 and calculator 2) who have not responded to diet, exercise, or weight loss medicines   Body mass index between 35 and 40, along with a serious medical problem (including diabetes, severe joint pain, or sleep apnea) that would improve with weight loss  You should be sure that you understand the potential risks and benefits of weight loss surgery. You must be motivated and willing to make lifelong changes in how you eat to reach and maintain a healthier weight after surgery. You must also be realistic about weight loss after surgery (see 'Effectiveness of weight loss surgery' below). PREPARING FOR WEIGHT LOSS SURGERY -- Most people who have weight loss surgery will meet with several specialists before surgery is scheduled. This often includes a dietitian, mental health counselor, a doctor who specializes in care of obese people, and a surgeon who performs weight loss surgery (bariatric surgeon).  You may need to work with these providers for several weeks or months before surgery. The nutritionist will explain what and how much you will be able to eat after surgery. You may also need to lose a small amount of weight before surgery. The mental health specialist will help you to cope with stress and other factors that can make it harder to lose weight or trigger you to eat   The medical doctor will determine whether you need other tests, counseling, or treatment before surgery. He or she might also help you begin a medical weight loss program so that you can lose some weight before surgery. The bariatric surgeon will meet with you to discuss the surgeries available to treat obesity. He or she will also make sure you are a good candidate for surgery. TYPES OF WEIGHT LOSS SURGERY -- There are several types of weight loss surgeries, the most common being lap banding, gastric bypass, and gastric sleeve. Lap banding -- Laparoscopic adjustable gastric banding (LAGB), or lap banding, is a surgery that uses an adjustable band around the opening to the stomach (figure 1). This reduces the amount of food that you can eat at one time. Lap banding is done through small incisions, with a laparoscope. The band can be adjusted after surgery, allowing you to eat more or less food. Adjustments to the size and tightness of the band are made by using a needle to add or remove fluid from a port (a small container under the skin that is connected to the band). Adding fluid to the band makes it tighter which restricts the amount of food you can eat and may help you to lose more weight. Lap banding is a popular choice because it is relatively simple to perform, can be adjusted or removed, and has a low risk of serious complications immediately after surgery. However, weight loss with the lap band depends on your ability to follow the program closely.   You will need to prepare nutritious meals that \"work with\" the band, not against it. For example, the lap band will not work well if you eat or drink a large amount of liquid calories (like ice cream). The band will not help you to feel full when you eat/drink liquid calories. Weight loss ranges from 45 to 75 percent after two years. As an example, a person who is 120 pounds overweight could expect to lose approximately 54 to 90 pounds in the two years after lap banding. Gastric bypass -- Barrie-en-Y gastric bypass, also called gastric bypass, helps you to lose weight by reducing the amount of food you can eat and reducing the number of calories and nutrients you absorb from the food you eat. To perform gastric bypass, a surgeon creates a small stomach pouch by dividing the stomach and attaching it to the small intestine. This helps you to lose weight in two ways: The smaller stomach can hold less food than before surgery. This causes you to feel full after eating a very small amount of food or liquid. Over time, the pouch might stretch, allowing you to eat more food. The body absorbs fewer calories, since food bypasses most of the stomach as well as the upper small intestine. This new arrangement seems to decrease your appetite and change how you break down foods by changing the release of various hormones. Gastric bypass can be performed as open surgery (through an incision on the abdomen) or laparoscopically, which uses smaller incisions and smaller instruments. Both the laparoscopic and open techniques have risks and benefits. You and your surgeon should work together to decide which surgery, if any, is right for you. Gastric bypass has a high success rate, and people lose an average of 62 to 68 percent of their excess body weight in the first year. Weight loss typically levels off after one to two years, with an overall excess weight loss between 50 and 75 percent. For a person who is 120 pounds overweight, an average of 60 to 90 pounds of weight loss would be expected.   Gastric sleeve -- Gastric sleeve, also known as sleeve gastrectomy, is a surgery that reduces the size of the stomach and makes it into a narrow tube (figure 3). The new stomach is much smaller and produces less of the hormone (ghrelin) that causes hunger, helping you feel satisfied with less food. Sleeve gastrectomy is safer than gastric bypass because the intestines are not rearranged, and there is less chance of malnutrition. It also appears to control hunger better than lap banding. It might be safer than the lap banding because no foreign materials are used. The gastric sleeve has a good success rate, and people lose an average of 33 percent of their excess body weight in the first year. For a person who is 120 pounds overweight, this would mean losing about 40 pounds in the first year. WEIGHT LOSS SURGERY COMPLICATIONS -- A variety of complications can occur with weight loss surgery. The risks of surgery depend upon which surgery you have and any medical problems you had before surgery. Some of the more common early surgical complications (one to six weeks after surgery) include:  Bleeding   Infection   Blockage or tear in the bowels   Need for further surgery  Important medical complications after surgery can include blood clots in the legs or lungs, heart attack, pneumonia, and urinary tract infection. Complications are less likely when surgery is performed in centers that are experienced in weight loss surgery. In general, centers with experience in weight loss surgery have:  Board-certified doctors and surgeons   A team of support staff (dietitians, counselors, nurses)   Long-term follow-up after surgery   Hospital staff experienced with the care of weight loss patients. This includes nurses who are trained in the care of patients immediately after surgery and anesthesiologists who are experienced in caring for the morbidly obese.   EFFECTIVENESS OF WEIGHT LOSS SURGERY -- The goal of weight loss surgery is to reduce the risk of illness or death associated with obesity. Weight loss surgery can also help you to feel and look better, reduce the amount of money you spend on medicines, and cut down on sick days. As an example, weight loss surgery can improve health problems related to obesity (diabetes, high blood pressure, high cholesterol, sleep apnea) to the point that you need less or no medicine. Finally, weight loss surgery might reduce your risk of developing heart disease, cancer, and certain infections. AFTER WEIGHT LOSS SURGERY -- You will need to stay in the hospital until your team feels that it is safe for you to leave (on average, one to three days). Do not drive if you are taking prescription pain medicine. Begin exercising as soon as possible once you have healed; most weight loss centers will design an exercise program for you. Once you are home, it is important to eat and drink exactly what your doctor and dietitian recommend. You will see your doctor, nurse, and dietitian on a regular basis after surgery to monitor your health, diet, and weight loss. You will be able to slowly increase how much you eat over time, although it will always be important to:  Eat small, frequent meals and not skip meals   Chew your food slowly and completely   Avoid eating while \"distracted\" (such as eating while watching TV)   Stop eating when you feel full   Drink liquids at least 30 minutes before or after eating   Avoid foods high in fat or sugar   Take vitamin supplements, as recommended  It can take several months to learn to listen to your body so that you know when you are hungry and when you are full. You may dislike foods you previously loved, and you may begin to prefer new foods. This can be a frustrating process for some people, so talk to your dietitian if you are having trouble. It usually takes between one and two years to lose weight after surgery.  After reaching their goal weight, some people have or brushing your teeth using your nondominant hand   Use Memory Aids   There is no need to remember every detail on your own. These memory aids can help:   Calendars and day planners   Electronic organizers to store all sorts of helpful informationThese devices can \"beep\" to remind you of appointments. A book of days to record birthdays, anniversaries, and other occasions that occur on the same date every year   Detailed \"to-do\" lists and strategically placed sticky notes   Quick \"study\" sessionsBefore a gathering, review who will be there so their names will be fresh in your mind. Establish routinesFor example, keep your keys, wallet, and umbrella in the same place all the time or take medicine with your 8:00 AM glass of juice   Live a Healthy Life   Many actions that will keep your body strong will do the same for your mind. For example:   Talk to Your Doctor About Herbs and Supplements    Malnutrition and vitamin deficiencies can impair your mental function. For example, vitamin B12 deficiency can cause a range of symptoms, including confusion. But, what if your nutritional needs are being met? Can herbs and supplements still offer a benefit? Researchers have investigated a range of natural remedies, such as ginkgo , ginseng , and the supplement phosphatidylserine (PS). So far, though, the evidence is inconsistent as to whether these products can improve memory or thinking. If you are interested in taking herbs and supplements, talk to your doctor first because they may interact with other medicines that you are taking. Exercise Regularly    Among the many benefits of regular exercise are increased blood flow to the brain and decreased risk of certain diseases that can interfere with memory function. One study found that even moderate exercise has a beneficial effect.  Examples of \"moderate\" exercise include:   Playing 18 holes of golf once a week, without a cart   Playing tennis twice a week   Walking one mile per day   Manage Stress    It can be tough to remember what is important when your mind is cluttered. Make time for relaxation. Choose activities that calm you down, and make it routine. Manage Chronic Conditions    Side effects of high blood pressure , diabetes, and heart disease can interfere with mental function. Many of the lifestyle steps discussed here can help manage these conditions. Strive to eat a healthy diet, exercise regularly, get stress under control, and follow your doctor's advice for your condition. Minimize Medications    Talk to your doctor about the medicines that you take. Some may be unnecessary. Also, healthy lifestyle habits may lower the need for certain drugs. Last Reviewed: April 2010 Sven Humphries MD   Updated: 4/13/2010   ·        Advance Care Planning: Care Instructions  Your Care Instructions     It can be hard to live with an illness that cannot be cured. But if your health is getting worse, you may want to make decisions about end-of-life care. Planning for the end of your life does not mean that you are giving up. It is a way to make sure that your wishes are met. Clearly stating your wishes can make it easier for your loved ones. Making plans while you are still able may also ease your mind and make your final days less stressful and more meaningful. Follow-up care is a key part of your treatment and safety. Be sure to make and go to all appointments, and call your doctor if you are having problems. It's also a good idea to know your test results and keep a list of the medicines you take. What can you do to plan for the end of life? You can bring these issues up with your doctor. You do not need to wait until your doctor starts the conversation. You might start with, \"What makes life worth living for me is. Luly Bloodgood Luly Bloodgood \" And then follow it with, \"I would not be willing to live with . Luly Bloodgood Luly Bloodgood Luly Bloodgood \" When you complete this sentence it helps your doctor understand your wishes.   Talk openly and honestly with your doctor. This is the best way to understand the decisions you will need to make as your health changes. Know that you can always change your mind. Ask your doctor about commonly used life-support measures. These include tube feedings, breathing machines, and fluids given through a vein (IV). Understanding these treatments will help you decide whether you want them. You may choose to have these life-supporting treatments for a limited time. This allows a trial period to see whether they will help you. You may also decide that you want your doctor to take only certain measures to keep you alive. It may help to think about the big picture, like what makes life worth living for you or what your values and goals are. Talk to your doctor about how long you are likely to live. Your doctor may be able to give you an idea of what usually happens with your specific illness. Think about preparing papers that state your wishes. These papers are called advance directives. If you do this early and review them often, there will not be any confusion about what you want. You can change your instructions at any time. Which papers should you prepare? Advance directives are legal papers that tell doctors how you want to be cared for at the end of your life. You do not need a  to write these papers. Ask your doctor or your state health department for information on how to write your advance directives. They may have the forms for each of these types of papers. Make sure your doctor has a copy of these on file, and give a copy to a family member or close friend. Consider a do-not-resuscitate order (DNR). This order asks that no extra treatments be done if your heart stops or you stop breathing. Extra treatments may include cardiopulmonary resuscitation (CPR), electrical shock to restart your heart, or a machine to breathe for you.  If you decide to have a DNR order, ask your doctor to explain and write it. Place the order in your home where everyone can easily see it. Consider a living will. A living will explains your wishes about life support and other treatments at the end of your life if you become unable to speak for yourself. Living garcia tell doctors to use or not use treatments that would keep you alive. You must have one or two witnesses or a notary present when you sign this form. A living will may be called something else in your state. Consider a medical power of . This form allows you to name a person to make decisions about your care if you are not able to. Most people ask a close friend or family member. Talk to this person about the kinds of treatments you want and those that you do not want. Make sure this person understands your wishes. A medical power of  may be called something else in your state. These legal papers are simple to change. Tell your doctor what you want to change, and ask him or her to make a note in your medical file. Give your family updated copies of the papers. Where can you learn more? Go to https://The [email or web address]. org and sign in to your bitmovin account. Enter P184 in the [employer] box to learn more about \"Advance Care Planning: Care Instructions. \"     If you do not have an account, please click on the \"Sign Up Now\" link. Current as of: March 17, 2021               Content Version: 13.0  © 2006-2021 Healthwise, Incorporated. Care instructions adapted under license by [facility] ([facility]). If you have questions about a medical condition or this instruction, always ask your healthcare professional. [place] [address] any warranty or liability for your use of this information. ·        Learning About Living Franklyn Rojo  What is a living will? A living will, also called a declaration, is a legal form. It tells your family and your doctor your wishes when you can't speak for yourself.  It's used by the health professionals who will treat you as you near the end of your life or if you get seriously hurt or ill. If you put your wishes in writing, your loved ones and others will know what kind of care you want. They won't need to guess. This can ease your mind and be helpful to others. And you can change or cancel your living will at any time. A living will is not the same as an estate or property will. An estate will explains what you want to happen with your money and property after you die. How do you use it? A living will is used to describe the kinds of treatment or life support you want as you near the end of your life or if you get seriously hurt or ill. Keep these facts in mind about living garcia. Your living will is used only if you can't speak or make decisions for yourself. Most often, one or more doctors must certify that you can't speak or decide for yourself before your living will takes effect. If you get better and can speak for yourself again, you can accept or refuse any treatment. It doesn't matter what you said in your living will. Some states may limit your right to refuse treatment in certain cases. For example, you may need to clearly state in your living will that you don't want artificial hydration and nutrition, such as being fed through a tube. Is a living will a legal document? A living will is a legal document. Each state has its own laws about living garcia. And a living will may be called something else in your state. Here are some things to know about living garcia. You don't need an  to complete a living will. But legal advice can be helpful if your state's laws are unclear. It can also help if your health history is complicated or your family can't agree on what should be in your living will. You can change your living will at any time. Some people find that their wishes about end-of-life care change as their health changes.  If you make big changes to your living will, complete a new form. If you move to another state, make sure that your living will is legal in the state where you now live. In most cases, doctors will respect your wishes even if you have a form from a different state. You might use a universal form that has been approved by many states. This kind of form can sometimes be filled out and stored online. Your digital copy will then be available wherever you have a connection to the internet. The doctors and nurses who need to treat you can find it right away. Your state may offer an online registry. This is another place where you can store your living will online. It's a good idea to get your living will notarized. This means using a person called a CreativeLive to watch two people sign, or witness, your living will. What should you know when you create a living will? Here are some questions to ask yourself as you make your living will:  Do you know enough about life support methods that might be used? If not, talk to your doctor so you know what might be done if you can't breathe on your own, your heart stops, or you can't swallow. What things would you still want to be able to do after you receive life-support methods? Would you want to be able to walk? To speak? To eat on your own? To live without the help of machines? Do you want certain Mu-ism practices performed if you become very ill? If you have a choice, where do you want to be cared for? In your home? At a hospital or nursing home? If you have a choice at the end of your life, where would you prefer to die? At home? In a hospital or nursing home? Somewhere else? Would you prefer to be buried or cremated? Do you want your organs to be donated after you die? What should you do with your living will? Make sure that your family members and your health care agent have copies of your living will (also called a declaration). Give your doctor a copy of your living will.  Ask him or her to keep it as part of your medical record. If you have more than one doctor, make sure that each one has a copy. Put a copy of your living will where it can be easily found. For example, some people may put a copy on their refrigerator door. If you are using a digital copy, be sure your doctor, family members, and health care agent know how to find and access it. Where can you learn more? Go to https://chpepiceweb.[email or web address] org and sign in to your Gracious Eloise account. Enter Q229 in the [facility] box to learn more about \"Learning About Living Joselyn Moreau. \"     If you do not have an account, please click on the \"Sign Up Now\" link. Current as of: March 17, 2021               Content Version: 13.0  © 9053-7575 [employer] Care instructions adapted under license by [facility] [place] ([facility]). If you have questions about a medical condition or this instruction, always ask your healthcare professional. [place] [address] any warranty or liability for your use of this information. ·        Learning About Medical Power of   What is a medical power of ? A medical power of , also called a durable power of  for health care, is one type of the legal forms called advance directives. It lets you name the person you want to make treatment decisions for you if you can't speak or decide for yourself. The person you choose is called your health care agent. This person is also called a health care proxy or health care surrogate. A medical power of  may be called something else in your state. How do you choose a health care agent? Choose your health care agent carefully. This person may or may not be a family member. Talk to the person before you make your final decision. Make sure he or she is comfortable with this responsibility. It's a good idea to choose someone who:  Is at least 25years old.   Knows you well and understands what makes life meaningful for you.  Understands your Holiness and moral values. Will do what you want, not what he or she wants. Will be able to make difficult choices at a stressful time. Will be able to refuse or stop treatment, if that is what you would want, even if you could die. Will be firm and confident with health professionals if needed. Will ask questions to get needed information. Lives near you or agrees to travel to you if needed. Your family may help you make medical decisions while you can still be part of that process. But it's important to choose one person to be your health care agent in case you aren't able to make decisions for yourself. If you don't fill out the legal form and name a health care agent, the decisions your family can make may be limited. A health care agent may be called something else in your state. Who will make decisions for you if you don't have a health care agent? If you don't have a health care agent or a living will, you may not get the care you want. Decisions may be made by family members who disagree about your medical care. Or decisions may be made by a medical professional who doesn't know you well. In some cases, a  makes the decisions. When you name a health care agent, it is very clear who has the power to make health decisions for you. How do you name a health care agent? You name your health care agent on a legal form. This form is usually called a medical power of . Ask your hospital, state bar association, or office on aging where to find these forms. You must sign the form to make it legal. Some states require you to get the form notarized. This means that a person called a  watches you sign the form and then he or she signs the form. Some states also require that two or more witnesses sign the form. Be sure to tell your family members and doctors who your health care agent is. Where can you learn more?   Go to \"abnormal\" LDCT results are false in the sense that no lung cancer is ultimately found. Additionally, some (about 10%) of the cancers found would not affect your life expectancy, even if undetected and untreated. If you are still smoking, the single most important thing that you can do to reduce your risk of dying of lung cancer is to quit. For this screening to be covered by Medicare and most other insurers, strict criteria must be met. If you do not meet these criteria, but still wish to undergo LDCT testing, you will be required to sign a waiver indicating your willingness to pay for the scan. Patient Education        DASH Diet: Care Instructions  Your Care Instructions     The DASH diet is an eating plan that can help lower your blood pressure. DASH stands for Dietary Approaches to Stop Hypertension. Hypertension is high blood pressure. The DASH diet focuses on eating foods that are high in calcium, potassium, and magnesium. These nutrients can lower blood pressure. The foods that are highest in these nutrients are fruits, vegetables, low-fat dairy products, nuts, seeds, and legumes. But taking calcium, potassium, and magnesium supplements instead of eating foods that are high in those nutrients does not have the same effect. The DASH diet also includes whole grains, fish, and poultry. The DASH diet is one of several lifestyle changes your doctor may recommend to lower your high blood pressure. Your doctor may also want you to decrease the amount of sodium in your diet. Lowering sodium while following the DASH diet can lower blood pressure even further than just the DASH diet alone. Follow-up care is a key part of your treatment and safety. Be sure to make and go to all appointments, and call your doctor if you are having problems. It's also a good idea to know your test results and keep a list of the medicines you take. How can you care for yourself at home?   Following the DASH diet  Eat 4 to 5 servings of fruit each day. A serving is 1 medium-sized piece of fruit, ½ cup chopped or canned fruit, 1/4 cup dried fruit, or 4 ounces (½ cup) of fruit juice. Choose fruit more often than fruit juice. Eat 4 to 5 servings of vegetables each day. A serving is 1 cup of lettuce or raw leafy vegetables, ½ cup of chopped or cooked vegetables, or 4 ounces (½ cup) of vegetable juice. Choose vegetables more often than vegetable juice. Get 2 to 3 servings of low-fat and fat-free dairy each day. A serving is 8 ounces of milk, 1 cup of yogurt, or 1 ½ ounces of cheese. Eat 6 to 8 servings of grains each day. A serving is 1 slice of bread, 1 ounce of dry cereal, or ½ cup of cooked rice, pasta, or cooked cereal. Try to choose whole-grain products as much as possible. Limit lean meat, poultry, and fish to 2 servings each day. A serving is 3 ounces, about the size of a deck of cards. Eat 4 to 5 servings of nuts, seeds, and legumes (cooked dried beans, lentils, and split peas) each week. A serving is 1/3 cup of nuts, 2 tablespoons of seeds, or ½ cup of cooked beans or peas. Limit fats and oils to 2 to 3 servings each day. A serving is 1 teaspoon of vegetable oil or 2 tablespoons of salad dressing. Limit sweets and added sugars to 5 servings or less a week. A serving is 1 tablespoon jelly or jam, ½ cup sorbet, or 1 cup of lemonade. Eat less than 2,300 milligrams (mg) of sodium a day. If you limit your sodium to 1,500 mg a day, you can lower your blood pressure even more. Be aware that all of these are the suggested number of servings for people who eat 1,800 to 2,000 calories a day. Your recommended number of servings may be different if you need more or fewer calories. Tips for success  Start small. Do not try to make dramatic changes to your diet all at once. You might feel that you are missing out on your favorite foods and then be more likely to not follow the plan. Make small changes, and stick with them.  Once those

## 2021-10-21 NOTE — PROGRESS NOTES
Medicare Annual Wellness Visit  Name: Bryan Orozco Date: 10/21/2021   MRN: [id number] Sex: Female   Age: 77 y.o. Ethnicity: Unavailable / Unknown   : 1955 Race: White (non-)      Jason Villela is here for Medicare AWV    Screenings for behavioral, psychosocial and functional/safety risks, and cognitive dysfunction are all negative except as indicated below. These results, as well as other patient data from the [address] form, are documented in Flowsheets linked to this Encounter. Allergies   Allergen Reactions    Mushroom Extract Complex     Mushroom Extract Complex          Prior to Visit Medications    Medication Sig Taking? Authorizing Provider   Triamcinolone Acetonide (NASACORT ALLERGY 24HR NA) by Nasal route Yes Historical Provider, MD   fluocinonide (LIDEX) 0.05 % external solution Apply topically 2 times daily Apply topically 2 times daily.  Yes Historical Provider, MD   GLUCOSAMINE-CHONDROITIN PO Take by mouth Yes Historical Provider, MD   COLLAGEN PO Take by mouth Yes Historical Provider, MD   acetaminophen (TYLENOL) 500 MG tablet Take 500 mg by mouth every 6 hours as needed for Pain Yes Historical Provider, MD         Past Medical History:   Diagnosis Date    10 year risk of MI or stroke 7.5% or greater 10/21/2021    History of tobacco use 2021    Hyperlipidemia 2021    Lipoma of scalp 2021    Personal history of tobacco use 2021    Primary hypertension 10/13/2021    Primary osteoarthritis of both knees 2021    Varicose veins of both lower extremities 2021       Past Surgical History:   Procedure Laterality Date     SECTION N/A     ENDOMETRIAL BIOPSY N/A     KNEE ARTHROSCOPY Right 2021    TUBAL LIGATION Bilateral 10/2003    VARICOSE VEIN SURGERY  2004    VEIN SURGERY  2016    VEIN SURGERY  10/2016         Family History   Problem Relation Age of Onset    Brain Cancer Mother 79    Lupus Father     Hypercalcemia Sister     Hemochromatosis Sister     Dementia Sister        CareTeam (Including outside providers/suppliers regularly involved in providing care):   Patient Care Team:  Darnell Russo MD as PCP - General (Family Medicine)  Darnell Russo MD as PCP - [facility]    Wt Readings from Last 3 Encounters:   10/21/21 275 lb (124.7 kg)   09/09/21 280 lb 6.4 oz (127.2 kg)   07/27/21 276 lb 12.8 oz (125.6 kg)     Vitals:    10/21/21 1619   BP: (!) 146/90   Site: Left Upper Arm   Position: Sitting   Cuff Size: Thigh   Pulse: 70   Resp: 20   Temp: 96.4 °F (35.8 °C)   TempSrc: Temporal   SpO2: 95%   Weight: 275 lb (124.7 kg)   Height: 5' 6\" (1.676 m)     Body mass index is 44.39 kg/m². Based upon direct observation of the patient, evaluation of cognition reveals recent and remote memory intact. Patient's complete Health Risk Assessment and screening values have been reviewed and are found in Flowsheets. The following problems were reviewed today and where indicated follow up appointments were made and/or referrals ordered. Positive Risk Factor Screenings with Interventions:            General Health and ACP:  General  In general, how would you say your health is?: Very Good  In the past 7 days, have you experienced any of the following?  New or Increased Pain, New or Increased Fatigue, Loneliness, Social Isolation, Stress or Anger?: None of These  Do you get the social and emotional support that you need?: Yes  Do you have a Living Will?: (!) No  Advance Directives     Power of [address] [place] [address] Will ACP-Advance Directive ACP-Power of     Not on File Not on File Not on File Not on File      General Health Risk Interventions:  · No Living Will: Patient referred to [facility] Habits/Nutrition:  Health Habits/Nutrition  Do you exercise for at least 20 minutes 2-3 times per week?: Yes  Have you lost any weight without trying in the past 3 months?: No  Do you eat only one meal per day?: No  Have you seen the dentist within the past year?: Yes  Body mass index: (!) 44.38  Health Habits/Nutrition Interventions:  · Nutritional issues:  educational materials for healthy, well-balanced diet provided, educational materials to promote weight loss provided       Personalized Preventive Plan   Current Health Maintenance Status  Immunization History   Administered Date(s) Administered    COVID-19, Moderna, PF, 100mcg/0.5mL 04/22/2021, 05/24/2021    Influenza, Quadv, adjuvanted, 65 yrs +, IM, PF (Fluad) 10/12/2021    Tdap (Boostrix, Adacel) 06/26/2021        Health Maintenance   Topic Date Due    Colon cancer screen colonoscopy  Never done    Breast cancer screen  Never done    Low dose CT lung screening  Never done    DEXA (modify frequency per FRAX score)  Never done    Annual Wellness Visit (AWV)  Never done    Shingles Vaccine (1 of 2) 10/21/2022 (Originally 10/17/2005)    Pneumococcal 65+ years Vaccine (1 of 1 - PPSV23) 10/21/2022 (Originally 10/17/2020)    Potassium monitoring  09/16/2022    Creatinine monitoring  09/16/2022    Lipid screen  09/16/2026    DTaP/Tdap/Td vaccine (2 - Td or Tdap) 06/26/2031    Flu vaccine  Completed    COVID-19 Vaccine  Completed    Hepatitis C screen  Completed    Hepatitis A vaccine  Aged Out    Hepatitis B vaccine  Aged Out    Hib vaccine  Aged Out    Meningococcal (ACWY) vaccine  Aged Out     Recommendations for [employer] Due: see orders and patient instructions/AVS.  . Recommended screening schedule for the next 5-10 years is provided to the patient in written form: see Patient Hailey Abernathy was seen today for medicare awv. Diagnoses and all orders for this visit:    1. Welcome to Medicare preventive visit  -     [facility] to [facility] Clinical Specialist  -     EKG 12 Lead  2. Need for vaccination  Comments:  Patient declines pneumococcal or shingles vaccination.   I reviewed with her potential risks of serious morbidity and even mortality with remaining unvaccinated. 3. Screening for colon cancer  -     Cologuard; Future  4. Screening for osteoporosis  -     DEXA BONE DENSITY AXIAL SKELETON; Future  5. Postmenopausal  -     DEXA BONE DENSITY AXIAL SKELETON; Future  6. Personal history of tobacco use  Assessment & Plan:  Low Dose CT (LDCT) Lung Screening criteria met:     Age 55-77(Medicare) or 50-80 ([facility])   Pack year smoking >30 (Medicare) or >20 ([facility])   Still smoking or less than 15 year since quit   No sign or symptoms of lung cancer   > 11 months since last LDCT     Risks and benefits of lung cancer screening with LDCT scans discussed:    Significance of positive screen - False-positive LDCT results often occur. 95% of all positive results do not lead to a diagnosis of cancer. Usually further imaging can resolve most false-positive results; however, some patients may require invasive procedures. Over diagnosis risk - 10% to 12% of screen-detected lung cancer cases are over diagnosed--that is, the cancer would not have been detected in the patient's lifetime without the screening. Need for follow up screens annually to continue lung cancer screening effectiveness     Risks associated with radiation from annual LDCT- Radiation exposure is about the same as for a mammogram, which is about 1/3 of the annual background radiation exposure from everyday life. Starting screening at age 54 is not likely to increase cancer risk from radiation exposure. Patients with comorbidities resulting in life expectancy of < 10 years, or that would preclude treatment of an abnormality identified on CT, should not be screened due to lack of benefit. To obtain maximal benefit from this screening, smoking cessation and long-term abstinence from smoking is critical     Orders:  -     SD VISIT TO DISCUSS LUNG CA SCREEN W LDCT  -     CT Lung Screen (Annual); Future  7.  Primary hypertension  Assessment & Plan:  Patient with continued elevated blood pressure readings. I reviewed with her again the importance of long-term blood pressure control and the risks of serious morbidity and even mortality with uncontrolled blood pressure over time. Patient voices understanding, but still declines treatment. I stressed with her the importance of a low-salt diet and she was given handout with information regarding DASH diet today in the office. We reviewed the benefits of routine moderate intensity exercise for 150 minutes/week. We will reassess in 3 months  8. Morbid obesity ([address]  Assessment & Plan:  Patient reports her insurance does not cover a dietitian based on her current medical diagnoses. I reviewed with her the benefits of a lower salt and lower carbohydrate diet and the benefits of routine moderate intensity exercise for 150 minutes/week. Patient will continue making her best efforts with lifestyle changes and we will continue to monitor over time. 9. Personal history of noncompliance with medical treatment, presenting hazards to health  Assessment & Plan:   Patient declines medication management of hypertension. I reviewed with her risks of serious morbidity with continued elevated BP over time. She voiced understanding and still declined any further treatment. She was advised to contact the office should she change her mind in the future about treatment.            Return in 3 months for chronic disease check

## 2021-10-27 ENCOUNTER — CLINICAL DOCUMENTATION (OUTPATIENT)
Dept: SPIRITUAL SERVICES | Age: 66
End: 2021-10-27

## 2021-10-27 ENCOUNTER — TELEPHONE (OUTPATIENT)
Dept: CASE MANAGEMENT | Age: 66
End: 2021-10-27

## 2021-10-27 NOTE — TELEPHONE ENCOUNTER
Physician documentation on smoking history and CT Lung Screening reviewed. All required documentation complete. Patient is a former smoker (quit 2020) with a 25 pack year history ( .50 ppd x 50 years) per physician documentation.

## 2021-10-27 NOTE — PROGRESS NOTES
Advance Care Planning   Ambulatory ACP Specialist Patient Outreach    Date:  10/27/2021  ACP Specialist:  Yobany Natarajan    Outreach call to patient in follow-up to ACP Specialist referral from: Nacho Morales MD    [x] PCP  [] Provider   [] Ambulatory Care Management [] Other for Reason:    [x] Advance Directive Assistance  [] Code Status Discussion  [] Complete Portable DNR Order  [] Discuss Goals of Care  [] Complete POST/MOST  [] Early ACP Decision-Making  [] Other    Date Referral Received:10/22/21    Today's Outreach:  [x] First   [] Second  [] Third                               Third outreach made by [x]  phone  [x] email [x]   [employer]     Intervention:  [] Spoke with Patient  [x] Left VM requesting return call      Outcome:Left detailed VM for patient to call back, Sent Email with ACP Packet attached, EDAN. Will reach out again in two weeks. Next Step:   [] ACP scheduled conversation  [x] Outreach again in two week               [x] Email / Mail ACP Info Sheets  [x] Email / Mail Advance Directive            [] Close Referral. Routing closure to referring provider/staff and to ACP Specialist .      Thank you for this referral.

## 2021-10-29 ENCOUNTER — HOSPITAL ENCOUNTER (OUTPATIENT)
Dept: WOMENS IMAGING | Age: 66
Discharge: HOME | End: 2021-10-31
Payer: MEDICARE

## 2021-10-29 DIAGNOSIS — Z12.31 ENCOUNTER FOR SCREENING MAMMOGRAM FOR MALIGNANT NEOPLASM OF BREAST: ICD-10-CM

## 2021-10-29 PROCEDURE — 77067 SCR MAMMO BI INCL CAD: CPT

## 2021-11-03 ENCOUNTER — HOSPITAL ENCOUNTER (OUTPATIENT)
Dept: GENERAL RADIOLOGY | Age: 66
Discharge: HOME | End: 2021-11-05
Payer: MEDICARE

## 2021-11-03 ENCOUNTER — OFFICE VISIT (OUTPATIENT)
Dept: ORTHOPEDIC SURGERY | Age: 66
End: 2021-11-03
Payer: MEDICARE

## 2021-11-03 VITALS
HEART RATE: 86 BPM | WEIGHT: 275 LBS | TEMPERATURE: 98.1 F | HEIGHT: 66 IN | BODY MASS INDEX: 44.2 KG/M2 | OXYGEN SATURATION: 96 %

## 2021-11-03 DIAGNOSIS — M17.11 PRIMARY OSTEOARTHRITIS OF RIGHT KNEE: ICD-10-CM

## 2021-11-03 DIAGNOSIS — M17.11 PRIMARY OSTEOARTHRITIS OF RIGHT KNEE: Primary | ICD-10-CM

## 2021-11-03 PROCEDURE — 1090F PRES/ABSN URINE INCON ASSESS: CPT | Performed by: ORTHOPAEDIC SURGERY

## 2021-11-03 PROCEDURE — G8417 CALC BMI ABV UP PARAM F/U: HCPCS | Performed by: ORTHOPAEDIC SURGERY

## 2021-11-03 PROCEDURE — 99204 OFFICE O/P NEW MOD 45 MIN: CPT | Performed by: ORTHOPAEDIC SURGERY

## 2021-11-03 PROCEDURE — 73564 X-RAY EXAM KNEE 4 OR MORE: CPT | Performed by: ORTHOPAEDIC SURGERY

## 2021-11-03 PROCEDURE — G8484 FLU IMMUNIZE NO ADMIN: HCPCS | Performed by: ORTHOPAEDIC SURGERY

## 2021-11-03 PROCEDURE — 73564 X-RAY EXAM KNEE 4 OR MORE: CPT

## 2021-11-03 PROCEDURE — G8427 DOCREV CUR MEDS BY ELIG CLIN: HCPCS | Performed by: ORTHOPAEDIC SURGERY

## 2021-11-03 ASSESSMENT — ENCOUNTER SYMPTOMS
EYE PAIN: 0
ABDOMINAL PAIN: 0
CONSTIPATION: 0
DIARRHEA: 0
EYE ITCHING: 0
COUGH: 0
SHORTNESS OF BREATH: 0
EYE DISCHARGE: 0

## 2021-11-03 NOTE — PROGRESS NOTES
Food in the Last Year: Never true    [address] in the Last Year: Never true   Transportation Needs:     Lack of Transportation (Medical):  Lack of Transportation (Non-Medical):    Physical Activity:     Days of Exercise per Week:     Minutes of Exercise per Session:    Stress:     Feeling of Stress :    Social Connections:     Frequency of Communication with Friends and Family:     Frequency of Social Gatherings with Friends and Family:     Attends [employer] Services:     Active Member of Clubs or Organizations:     Attends Club or Organization Meetings:     Marital Status:    Intimate Partner Violence:     Fear of Current or Ex-Partner:     Emotionally Abused:     Physically Abused:     Sexually Abused:        Family History:    Family History   Problem Relation Age of Onset    Brain Cancer Mother 79    Lupus Father     Hypercalcemia Sister     Hemochromatosis Sister     Dementia Sister        Occupation:  retired   - Occupational requirements:  none    Workers Compensation:  Have you missed work for this issue?  no  Is this issue being addressed under a worker's comp claim? no    Review of Systems:    Review of Systems   Constitutional: Negative for activity change, appetite change and chills. HENT: Negative for congestion, ear pain and hearing loss. Eyes: Negative for pain, discharge and itching. Respiratory: Negative for cough and shortness of breath. Cardiovascular: Negative for chest pain and leg swelling. Gastrointestinal: Negative for abdominal pain, constipation and diarrhea. Endocrine: Negative for cold intolerance, heat intolerance and polydipsia. Genitourinary: Negative for difficulty urinating, flank pain and frequency. Skin: Negative for rash and wound. Allergic/Immunologic: Negative for environmental allergies and food allergies. Neurological: Negative for dizziness, seizures and syncope.        Physical Exam:    Examination of the right knee    Gait:  antalgic gait affecting right  Inspection:  neutral  Swelling:  none  Erythema:  none  Ecchymosis:  none  Effusion:  1+  Palpation:  distal medial femoral condyle, medial joint line and distal lateral femoral condyle  Extension:  5  Flexion:  110  Strength:  5  Varus/Valgus Instability:  none  Anterior/Posterior Instability:  none  Maryellen:  negative  Thessaly:  positive  Modified Apley:  negative  Lachman:  negative  Patellar compression:  positive  Neurological/Vascular:  Sensation grossly intact. Dorsalis pedis palpable and 2+    Radiographs:  Radiographs of the right knee were personally reviewed, bilateral standing AP, bilateral notch, bilateral sunrise and lateral right knee and they revealed:  no evidence of fracture, severe medial knee oa and Severe patellofemoral osteoarthritis bilaterally    MRI: None    Diagnosis:   Diagnosis Orders   1. Primary osteoarthritis of right knee  XR KNEE RIGHT (MIN 4 VIEWS)       Plan:    The patient has severe osteoarthritis of the right knee. Unfortunately, with the extent of her arthritic changes, I would not of expected an arthroscopic procedure to provide her with any relief. Unfortunately, this is the case. Treatment options were discussed. We discussed arthritis and its progression. We talked about anti-inflammatories, but the patient is unable to take these. Additionally, the patient does not feel that the severity of her symptoms warrant a steroid injection into the knee. We did, however, discuss steroid shots and gel shots. We also discussed surgical intervention. The patient, again, does not feel that anything needs to be done due to the improvement she is getting from her water therapy. I advised her on nonimpact activities. Ultimately, she will follow-up as needed as her symptoms dictate.     Orders Placed This Encounter   Procedures    XR KNEE RIGHT (MIN 4 VIEWS)     Scheduling Instructions:      AP weightbearing radiograph of bilateral knees with marker, lateral of right knee with marker, bilateral merchant of right knee, tunnel of bilateral knee     Order Specific Question:   Reason for exam:     Answer:   Knee pain       Orders Placed This Encounter   Medications    Handicap Placard MISC     Sig: by Does not apply route 2 years     Dispense:  1 each     Refill:  0       Return if symptoms worsen or fail to improve.     Raj Lott MD

## 2021-11-04 DIAGNOSIS — R92.8 ABNORMALITY OF LEFT BREAST ON SCREENING MAMMOGRAM: Primary | ICD-10-CM

## 2021-11-05 NOTE — RESULT ENCOUNTER NOTE
Please notify patient that recent screening mammogram shows benign appearing right breast with no suspicious findings. In the left breast radiology reports a focal asymmetric density noted on previous imaging but described as slightly more pronounced. Follow-up diagnostic mammogram and ultrasound recommended to further evaluate. Orders left in patient's chart, please help her arrange this follow-up imaging.

## 2021-11-10 ENCOUNTER — HOSPITAL ENCOUNTER (OUTPATIENT)
Dept: CT IMAGING | Age: 66
Discharge: HOME | End: 2021-11-12
Payer: MEDICARE

## 2021-11-10 ENCOUNTER — HOSPITAL ENCOUNTER (OUTPATIENT)
Dept: WOMENS IMAGING | Age: 66
Discharge: HOME | End: 2021-11-12
Payer: MEDICARE

## 2021-11-10 DIAGNOSIS — Z78.0 POSTMENOPAUSAL: ICD-10-CM

## 2021-11-10 DIAGNOSIS — Z13.820 SCREENING FOR OSTEOPOROSIS: ICD-10-CM

## 2021-11-10 DIAGNOSIS — Z87.891 PERSONAL HISTORY OF TOBACCO USE: Chronic | ICD-10-CM

## 2021-11-10 PROCEDURE — 77080 DXA BONE DENSITY AXIAL: CPT

## 2021-11-17 ENCOUNTER — HOSPITAL ENCOUNTER (OUTPATIENT)
Dept: WOMENS IMAGING | Age: 66
Discharge: HOME | End: 2021-11-19
Payer: MEDICARE

## 2021-11-17 ENCOUNTER — HOSPITAL ENCOUNTER (OUTPATIENT)
Dept: ULTRASOUND IMAGING | Age: 66
Discharge: HOME | End: 2021-11-19
Payer: MEDICARE

## 2021-11-17 DIAGNOSIS — R92.8 ABNORMALITY OF LEFT BREAST ON SCREENING MAMMOGRAM: ICD-10-CM

## 2021-11-17 PROCEDURE — G0279 TOMOSYNTHESIS, MAMMO: HCPCS

## 2021-12-02 ENCOUNTER — TELEPHONE (OUTPATIENT)
Dept: FAMILY MEDICINE CLINIC | Age: 66
End: 2021-12-02

## 2021-12-02 NOTE — TELEPHONE ENCOUNTER
Patient has been waiting on her cologuard results and has not heard anything. It looks like it was scanned into media 11/22/21. Please advise. Patient would like a call back asap with results.

## 2021-12-03 NOTE — TELEPHONE ENCOUNTER
This result was not forwarded to me and has not been abstracted to satisfy health maintenance care gap. Please call patient to inform her of negative cologuard result and please ensure that all scanned lab results are abstracted to their original orders and forwarded to the ordering provider.

## 2021-12-10 ENCOUNTER — IMMUNIZATION (OUTPATIENT)
Dept: FAMILY MEDICINE CLINIC | Age: 66
End: 2021-12-10
Payer: MEDICARE

## 2021-12-10 PROCEDURE — 0064A COVID-19, MODERNA BOOSTER VACCINE 0.25ML DOSE: CPT | Performed by: FAMILY MEDICINE

## 2021-12-10 PROCEDURE — 91306 COVID-19, MODERNA BOOSTER VACCINE 0.25ML DOSE: CPT | Performed by: FAMILY MEDICINE

## 2021-12-15 ENCOUNTER — CLINICAL DOCUMENTATION (OUTPATIENT)
Dept: SPIRITUAL SERVICES | Age: 66
End: 2021-12-15

## 2022-09-22 NOTE — TELEPHONE ENCOUNTER
[facility] Staff  Subject: Message to Provider     QUESTIONS   Information for Provider? Patient would like someone to call her   concerning her physical. She is not sure when she last had it and would   also like to talk to someone about how they do it in your office. ---------------------------------------------------------------------------   --------------   Giovanny RAMIRES   What is the best way for the office to contact you? OK to leave message on   voicemail   Preferred Call Back Phone Number?  [phone]   ---------------------------------------------------------------------------   --------------   SCRIPT ANSWERS [Follow-Up] : a follow-up evaluation of [[email or web address]] : vaginal odor and discharge

## 2023-02-22 LAB
ANION GAP SERPL CALCULATED.3IONS-SCNC: 12 MMOL/L (ref 10–20)
BICARBONATE: 26 MMOL/L (ref 21–32)
CALCIUM SERPL-MCNC: 8.9 MG/DL (ref 8.6–10.3)
CHLORIDE BLD-SCNC: 99 MMOL/L (ref 98–107)
CREAT SERPL-MCNC: 0.81 MG/DL (ref 0.5–1)
EGFR FEMALE: 79 ML/MIN/1.73M2
ERYTHROCYTE [DISTWIDTH] IN BLOOD BY AUTOMATED COUNT: 13.2 % (ref 11.5–14)
GLUCOSE: 97 MG/DL (ref 74–99)
HCT VFR BLD CALC: 39.2 % (ref 36–46)
HEMOGLOBIN: 12.9 G/DL (ref 12–16)
MCHC RBC AUTO-ENTMCNC: 32.9 G/DL (ref 32–36)
MCV RBC AUTO: 91 FL (ref 80–100)
PLATELET # BLD: 212 X10E9/L (ref 150–450)
POTASSIUM SERPL-SCNC: 4.5 MMOL/L (ref 3.5–5.3)
RBC # BLD: 4.33 X10E12/L (ref 4–5.2)
SODIUM BLD-SCNC: 132 MMOL/L (ref 136–145)
UREA NITROGEN: 23 MG/DL (ref 6–23)
WBC: 8.8 X10E9/L (ref 4.4–11.3)

## 2023-03-24 ENCOUNTER — TELEPHONE (OUTPATIENT)
Dept: PRIMARY CARE | Facility: CLINIC | Age: 68
End: 2023-03-24
Payer: MEDICARE

## 2023-05-22 ENCOUNTER — TELEMEDICINE (OUTPATIENT)
Dept: PRIMARY CARE | Facility: CLINIC | Age: 68
End: 2023-05-22
Payer: MEDICARE

## 2023-05-22 DIAGNOSIS — R05.8 DRY COUGH: Primary | ICD-10-CM

## 2023-05-22 DIAGNOSIS — U07.1 COVID-19: ICD-10-CM

## 2023-05-22 DIAGNOSIS — R06.09 DYSPNEA ON EXERTION: ICD-10-CM

## 2023-05-22 PROCEDURE — 99213 OFFICE O/P EST LOW 20 MIN: CPT | Performed by: NURSE PRACTITIONER

## 2023-05-22 RX ORDER — PANTOPRAZOLE SODIUM 40 MG/1
40 TABLET, DELAYED RELEASE ORAL
COMMUNITY
Start: 2023-02-22

## 2023-05-22 RX ORDER — PREDNISONE 10 MG/1
TABLET ORAL
Qty: 30 TABLET | Refills: 0 | Status: SHIPPED | OUTPATIENT
Start: 2023-05-22 | End: 2023-06-01

## 2023-05-22 RX ORDER — BENZONATATE 200 MG/1
200 CAPSULE ORAL 3 TIMES DAILY PRN
Qty: 20 CAPSULE | Refills: 0 | Status: SHIPPED | OUTPATIENT
Start: 2023-05-22 | End: 2023-05-29

## 2023-05-22 RX ORDER — NIRMATRELVIR AND RITONAVIR 300-100 MG
KIT ORAL 2 TIMES DAILY
COMMUNITY
Start: 2023-05-21

## 2023-05-22 RX ORDER — CYCLOBENZAPRINE HCL 10 MG
10 TABLET ORAL 3 TIMES DAILY PRN
COMMUNITY
Start: 2023-03-08

## 2023-05-22 NOTE — PATIENT INSTRUCTIONS
DISCHARGE SUMMARY:   Diagnosis, treatment, treatment options, and possible complications of today's illness discussed and explained to patient. Patient to take medication/s associated with this visit. Patient may also take OTC analgesic/antipyretic if needed for pain/fever. Advised to increase oral fluid intake. Advised steam inhalation if needed to relieve congestion. Advised warm saline gargle if needed to relieve throat discomfort. Advised to follow instructions with regards to COVID testing. Advised on social distancing and communicability prevention. Patient to call back if with worsening or persistent symptoms. Patient verbalized understanding of plan of care.    Patient to come back in 7 - 10 days if needed for worsening symptoms.

## 2023-05-22 NOTE — PROGRESS NOTES
Subjective   Patient ID: Pat Jimenez is a 67 y.o. female who is with chief complaint of severe dry cough.    HPI  Patient is a 67 y.o. female who CONSULTED AT [facility] - PHONE ONLY today. Patient is with complaints of nasal congestion, nasal discharge, headache / sinus pain, sore throat, severe dry cough, shortness of breath on exertion, fatigue, muscle ache, chills and fever. She denies having loss of sense of taste, loss of sense of smell, nor diarrhea. Patient states that present condition started about 4 days ago after being exposed to her  who recently tested positive for COVID. she denies chest pain, palpitations, nor edema. she stated that she  tried OTC medications which afforded only slight relief of symptoms. she denies nausea, vomiting, abdominal pain, nor any other symptoms.    Patient states she had her COVID vaccine.  Patient states she had the flu shot for this season.   She was seen at [facility] yesterday where she underwent testing for COVID and the result was POSITIVE. She was started on Paxlovid.     Review of Systems  General: no weight loss, generally healthy, (+) fatigue  Head: (+) headaches / sinus pain, no vertigo, no injury  Eyes: no diplopia, no tearing, no pain,   Ears: no change in hearing, no tinnitus, no bleeding, no vertigo  Mouth:  no dental difficulties, no gingival bleeding, (+) sore throat, no loss of sense of taste  Nose: (+) congestion, (+) discharge, no bleeding, no obstruction, no loss of sense of smell  Neck: no stiffness, no pain, no tenderness, no masses, no bruit  Pulmonary: (+) dyspnea on exertion, no wheezing, no hemoptysis, (+) severe dry cough  Cardiovascular: no chest pain, no palpitations, no syncope, no orthopnea  Gastrointestinal: no change in appetite, no dysphagia, no abdominal pains, no diarrhea, no emesis, no melena  Genito Urinary: no dysuria, no urinary urgency, no nocturia, no incontinence, no change in nature of  urine  Musculoskeletal: (+) muscle ache, no joint pain, no limitation of range of motion, no paresthesia, no numbness  Constitutional: (+) fever, (+) chills, no night sweats    Objective   NO VITAL SIGNS TAKEN FOR THIS PATIENT (VIRTUAL VISIT CONSULT - PHONE ONLY)    Physical Exam  PHYSICAL EXAMINATION WAS NOT DONE FOR THIS PATIENT (VIRTUAL VISIT CONSULT - PHONE ONLY)    Assessment/Plan   Problem List Items Addressed This Visit    None  Visit Diagnoses       Dry cough    -  Primary    Relevant Medications    benzonatate (Tessalon) 200 mg capsule    COVID-19        Relevant Medications    predniSONE (Deltasone) 10 mg tablet    benzonatate (Tessalon) 200 mg capsule    Dyspnea on exertion        Relevant Medications    predniSONE (Deltasone) 10 mg tablet        DISCHARGE SUMMARY:   Diagnosis, treatment, treatment options, and possible complications of today's illness discussed and explained to patient. Patient to take medication/s associated with this visit. Patient may also take OTC analgesic/antipyretic if needed for pain/fever. Advised to increase oral fluid intake. Advised steam inhalation if needed to relieve congestion. Advised warm saline gargle if needed to relieve throat discomfort. Advised to follow instructions with regards to COVID testing. Advised on social distancing and communicability prevention. Patient to call back if with worsening or persistent symptoms. Patient verbalized understanding of plan of care.    Patient to come back in 7 - 10 days if needed for worsening symptoms.

## 2023-05-22 NOTE — PROGRESS NOTES
Subjective   Patient ID: Pat Jimenez is a 67 y.o. female who presents for covid positive (Patient called in office because she has a COVID positive test at an urgent care since Sunday. patient states she is coughing and states that from coughing too  much her belly button hurts and that's where she has a hernia. Patient states she took Prilosec per [place] and took Nyquil with little to no help.).    HPI     Review of Systems    Objective   There were no vitals taken for this visit.    Physical Exam    Assessment/Plan

## 2023-06-14 ENCOUNTER — TELEPHONE (OUTPATIENT)
Dept: PRIMARY CARE | Facility: CLINIC | Age: 68
End: 2023-06-14
Payer: MEDICARE

## 2023-06-14 NOTE — TELEPHONE ENCOUNTER
Called patient and lmom to let them know they are eligible for AWV (starting in September) and to schedule apt if interested.     Please forward message to Jenny if patient scheduled or declined